# Patient Record
Sex: MALE | Race: WHITE | NOT HISPANIC OR LATINO | Employment: OTHER | URBAN - METROPOLITAN AREA
[De-identification: names, ages, dates, MRNs, and addresses within clinical notes are randomized per-mention and may not be internally consistent; named-entity substitution may affect disease eponyms.]

---

## 2023-10-27 ENCOUNTER — TELEPHONE (OUTPATIENT)
Dept: PAIN MEDICINE | Facility: CLINIC | Age: 63
End: 2023-10-27

## 2024-03-15 RX ORDER — OXYCODONE AND ACETAMINOPHEN 5; 325 MG/1; MG/1
1 TABLET ORAL EVERY 8 HOURS
COMMUNITY

## 2024-03-27 ENCOUNTER — APPOINTMENT (OUTPATIENT)
Dept: CARDIOLOGY | Facility: HOSPITAL | Age: 64
End: 2024-03-27
Payer: MEDICARE

## 2024-03-27 ENCOUNTER — APPOINTMENT (OUTPATIENT)
Dept: RADIOLOGY | Facility: HOSPITAL | Age: 64
End: 2024-03-27
Payer: MEDICARE

## 2024-03-27 ENCOUNTER — HOSPITAL ENCOUNTER (EMERGENCY)
Facility: HOSPITAL | Age: 64
Discharge: HOME | End: 2024-03-28
Payer: MEDICARE

## 2024-03-27 DIAGNOSIS — R19.7 DIARRHEA, UNSPECIFIED TYPE: Primary | ICD-10-CM

## 2024-03-27 LAB
ALBUMIN SERPL BCP-MCNC: 4.7 G/DL (ref 3.4–5)
ALP SERPL-CCNC: 129 U/L (ref 33–136)
ALT SERPL W P-5'-P-CCNC: 12 U/L (ref 10–52)
ANION GAP SERPL CALC-SCNC: 19 MMOL/L (ref 10–20)
AST SERPL W P-5'-P-CCNC: 13 U/L (ref 9–39)
BASOPHILS # BLD AUTO: 0.16 X10*3/UL (ref 0–0.1)
BASOPHILS NFR BLD AUTO: 0.9 %
BILIRUB SERPL-MCNC: 0.9 MG/DL (ref 0–1.2)
BUN SERPL-MCNC: 16 MG/DL (ref 6–23)
CALCIUM SERPL-MCNC: 10.2 MG/DL (ref 8.6–10.3)
CHLORIDE SERPL-SCNC: 106 MMOL/L (ref 98–107)
CO2 SERPL-SCNC: 19 MMOL/L (ref 21–32)
CREAT SERPL-MCNC: 1.12 MG/DL (ref 0.5–1.3)
EGFRCR SERPLBLD CKD-EPI 2021: 73 ML/MIN/1.73M*2
EOSINOPHIL # BLD AUTO: 0.53 X10*3/UL (ref 0–0.7)
EOSINOPHIL NFR BLD AUTO: 3 %
ERYTHROCYTE [DISTWIDTH] IN BLOOD BY AUTOMATED COUNT: 14 % (ref 11.5–14.5)
FLUAV RNA RESP QL NAA+PROBE: NOT DETECTED
FLUBV RNA RESP QL NAA+PROBE: NOT DETECTED
GLUCOSE SERPL-MCNC: 87 MG/DL (ref 74–99)
HCT VFR BLD AUTO: 45.5 % (ref 41–52)
HGB BLD-MCNC: 14.9 G/DL (ref 13.5–17.5)
HOLD SPECIMEN: NORMAL
IMM GRANULOCYTES # BLD AUTO: 0.08 X10*3/UL (ref 0–0.7)
IMM GRANULOCYTES NFR BLD AUTO: 0.5 % (ref 0–0.9)
LACTATE SERPL-SCNC: 0.8 MMOL/L (ref 0.4–2)
LIPASE SERPL-CCNC: 89 U/L (ref 9–82)
LYMPHOCYTES # BLD AUTO: 3.3 X10*3/UL (ref 1.2–4.8)
LYMPHOCYTES NFR BLD AUTO: 18.7 %
MAGNESIUM SERPL-MCNC: 1.64 MG/DL (ref 1.6–2.4)
MCH RBC QN AUTO: 26.6 PG (ref 26–34)
MCHC RBC AUTO-ENTMCNC: 32.7 G/DL (ref 32–36)
MCV RBC AUTO: 81 FL (ref 80–100)
MONOCYTES # BLD AUTO: 1.2 X10*3/UL (ref 0.1–1)
MONOCYTES NFR BLD AUTO: 6.8 %
NEUTROPHILS # BLD AUTO: 12.42 X10*3/UL (ref 1.2–7.7)
NEUTROPHILS NFR BLD AUTO: 70.1 %
NRBC BLD-RTO: 0 /100 WBCS (ref 0–0)
PLATELET # BLD AUTO: 384 X10*3/UL (ref 150–450)
POTASSIUM SERPL-SCNC: 3.5 MMOL/L (ref 3.5–5.3)
PROT SERPL-MCNC: 8.8 G/DL (ref 6.4–8.2)
RBC # BLD AUTO: 5.61 X10*6/UL (ref 4.5–5.9)
SARS-COV-2 RNA RESP QL NAA+PROBE: NOT DETECTED
SODIUM SERPL-SCNC: 140 MMOL/L (ref 136–145)
WBC # BLD AUTO: 17.7 X10*3/UL (ref 4.4–11.3)

## 2024-03-27 PROCEDURE — 83690 ASSAY OF LIPASE: CPT | Performed by: PHYSICIAN ASSISTANT

## 2024-03-27 PROCEDURE — 93005 ELECTROCARDIOGRAM TRACING: CPT

## 2024-03-27 PROCEDURE — 99285 EMERGENCY DEPT VISIT HI MDM: CPT | Mod: 25

## 2024-03-27 PROCEDURE — 2550000001 HC RX 255 CONTRASTS: Performed by: PHYSICIAN ASSISTANT

## 2024-03-27 PROCEDURE — 84075 ASSAY ALKALINE PHOSPHATASE: CPT | Performed by: PHYSICIAN ASSISTANT

## 2024-03-27 PROCEDURE — 36415 COLL VENOUS BLD VENIPUNCTURE: CPT | Performed by: PHYSICIAN ASSISTANT

## 2024-03-27 PROCEDURE — 83605 ASSAY OF LACTIC ACID: CPT | Performed by: PHYSICIAN ASSISTANT

## 2024-03-27 PROCEDURE — 74177 CT ABD & PELVIS W/CONTRAST: CPT

## 2024-03-27 PROCEDURE — 85025 COMPLETE CBC W/AUTO DIFF WBC: CPT | Performed by: PHYSICIAN ASSISTANT

## 2024-03-27 PROCEDURE — 87636 SARSCOV2 & INF A&B AMP PRB: CPT | Performed by: PHYSICIAN ASSISTANT

## 2024-03-27 PROCEDURE — 83735 ASSAY OF MAGNESIUM: CPT | Performed by: PHYSICIAN ASSISTANT

## 2024-03-27 PROCEDURE — 2500000001 HC RX 250 WO HCPCS SELF ADMINISTERED DRUGS (ALT 637 FOR MEDICARE OP): Performed by: NURSE PRACTITIONER

## 2024-03-27 PROCEDURE — 74177 CT ABD & PELVIS W/CONTRAST: CPT | Performed by: RADIOLOGY

## 2024-03-27 RX ORDER — OXYCODONE AND ACETAMINOPHEN 5; 325 MG/1; MG/1
1 TABLET ORAL ONCE
Status: COMPLETED | OUTPATIENT
Start: 2024-03-27 | End: 2024-03-27

## 2024-03-27 RX ADMIN — OXYCODONE HYDROCHLORIDE AND ACETAMINOPHEN 1 TABLET: 5; 325 TABLET ORAL at 23:04

## 2024-03-27 RX ADMIN — IOHEXOL 75 ML: 350 INJECTION, SOLUTION INTRAVENOUS at 21:53

## 2024-03-27 ASSESSMENT — LIFESTYLE VARIABLES
EVER FELT BAD OR GUILTY ABOUT YOUR DRINKING: NO
EVER HAD A DRINK FIRST THING IN THE MORNING TO STEADY YOUR NERVES TO GET RID OF A HANGOVER: NO
HAVE PEOPLE ANNOYED YOU BY CRITICIZING YOUR DRINKING: NO
HAVE YOU EVER FELT YOU SHOULD CUT DOWN ON YOUR DRINKING: NO
TOTAL SCORE: 0

## 2024-03-27 ASSESSMENT — PAIN - FUNCTIONAL ASSESSMENT
PAIN_FUNCTIONAL_ASSESSMENT: 0-10
PAIN_FUNCTIONAL_ASSESSMENT: 0-10

## 2024-03-27 ASSESSMENT — PAIN SCALES - GENERAL
PAINLEVEL_OUTOF10: 0 - NO PAIN
PAINLEVEL_OUTOF10: 0 - NO PAIN
PAINLEVEL_OUTOF10: 8

## 2024-03-27 ASSESSMENT — PAIN DESCRIPTION - ORIENTATION: ORIENTATION: LEFT

## 2024-03-27 ASSESSMENT — PAIN DESCRIPTION - LOCATION: LOCATION: LEG

## 2024-03-28 VITALS
DIASTOLIC BLOOD PRESSURE: 67 MMHG | RESPIRATION RATE: 15 BRPM | HEART RATE: 87 BPM | SYSTOLIC BLOOD PRESSURE: 106 MMHG | BODY MASS INDEX: 26.66 KG/M2 | OXYGEN SATURATION: 95 % | TEMPERATURE: 97.5 F | WEIGHT: 160 LBS | HEIGHT: 65 IN

## 2024-03-28 LAB
APPEARANCE UR: CLEAR
BILIRUB UR STRIP.AUTO-MCNC: NEGATIVE MG/DL
COLOR UR: YELLOW
GLUCOSE UR STRIP.AUTO-MCNC: NEGATIVE MG/DL
HOLD SPECIMEN: NORMAL
HYALINE CASTS #/AREA URNS AUTO: ABNORMAL /LPF
KETONES UR STRIP.AUTO-MCNC: NEGATIVE MG/DL
LEUKOCYTE ESTERASE UR QL STRIP.AUTO: NEGATIVE
NITRITE UR QL STRIP.AUTO: NEGATIVE
PH UR STRIP.AUTO: 5 [PH]
PROT UR STRIP.AUTO-MCNC: ABNORMAL MG/DL
RBC # UR STRIP.AUTO: NEGATIVE /UL
RBC #/AREA URNS AUTO: ABNORMAL /HPF
SP GR UR STRIP.AUTO: 1.04
UROBILINOGEN UR STRIP.AUTO-MCNC: <2 MG/DL
WBC #/AREA URNS AUTO: ABNORMAL /HPF

## 2024-03-28 PROCEDURE — 81001 URINALYSIS AUTO W/SCOPE: CPT | Performed by: PHYSICIAN ASSISTANT

## 2024-03-28 ASSESSMENT — PAIN SCALES - GENERAL
PAINLEVEL_OUTOF10: 5 - MODERATE PAIN
PAINLEVEL_OUTOF10: 0 - NO PAIN

## 2024-03-28 NOTE — ED PROVIDER NOTES
Limitations to History: None  External Records Reviewed  Independent Historians: None  Social determinants affecting care: None    HPI  Greg Bustos is a 63 y.o. male with history of CVA with left-sided weakness, hypertension, peripheral vascular disease, coronary artery disease, hyperlipidemia, COPD, depression, anxiety, bipolar disorder, who presents to the emergency department for assessment of diarrhea.  He reports that he is had multiple episodes of yellow-colored diarrhea.  He he denies any abdominal pains.  Has not had nausea or vomiting.  He reports that he was given Imodium however this has not helped.  He denies recent antibiotic usage.  He denies chest pains or shortness of breath.  He has not had fever or chills.  He reports slight cough and sneezing.  He is unsure if he had C. difficile colitis before.  He has no further complaints.    [facility]  Past Medical History:   Diagnosis Date    Abnormal findings on diagnostic imaging of other specified body structures     Abnormal CT of the chest    Abnormal findings on diagnostic imaging of other specified body structures     Abnormal CT of the chest    Personal history of other medical treatment     History of echocardiogram    Personal history of other medical treatment     History of nuclear stress test    reviewed by myself.    Meds  Current Outpatient Medications   Medication Instructions    oxyCODONE-acetaminophen (Percocet) 5-325 mg tablet 1 tablet, oral, Every 8 hours       Allergies  No Known Allergies reviewed by myself.    SHx  Social History     Tobacco Use    Smoking status: Never    Smokeless tobacco: Never   Vaping Use    Vaping Use: Never used   Substance Use Topics    Drug use: Never    reviewed by myself.      ------------------------------------------------------------------------------------------------------------------------------------------    /89 (BP Location: Right arm, Patient Position: Sitting)   Pulse 87   Temp 36.4 °C (97.5  "°F) (Temporal)   Resp 16   Ht 1.651 m (5' 5\")   Wt 72.6 kg (160 lb)   SpO2 95%   BMI 26.63 kg/m²     Physical Exam  Vitals and nursing note reviewed.   Constitutional:       General: He is not in acute distress.     Appearance: Normal appearance. He is normal weight. He is not ill-appearing or toxic-appearing.   HENT:      Head: Normocephalic.      Nose: Nose normal.      Mouth/Throat:      Mouth: Mucous membranes are moist.   Eyes:      Extraocular Movements: Extraocular movements intact.      Conjunctiva/sclera: Conjunctivae normal.   Cardiovascular:      Rate and Rhythm: Normal rate and regular rhythm.   Pulmonary:      Effort: Pulmonary effort is normal.      Breath sounds: Normal breath sounds.   Abdominal:      General: Abdomen is flat. Bowel sounds are normal.      Palpations: Abdomen is soft.      Tenderness: There is no abdominal tenderness.   Musculoskeletal:         General: Normal range of motion.      Cervical back: Neck supple.   Skin:     General: Skin is warm and dry.   Neurological:      Mental Status: He is alert and oriented to person, place, and time.      Comments: Left-sided weakness from previous stroke   Psychiatric:         Attention and Perception: Attention normal.         Mood and Affect: Mood normal.          ------------------------------------------------------------------------------------------------------------------------------------------  Labs  Labs Reviewed   CBC WITH AUTO DIFFERENTIAL - Abnormal       Result Value    WBC 17.7 (*)     nRBC 0.0      RBC 5.61      Hemoglobin 14.9      Hematocrit 45.5      MCV 81      MCH 26.6      MCHC 32.7      RDW 14.0      Platelets 384      Neutrophils % 70.1      Immature Granulocytes %, Automated 0.5      Lymphocytes % 18.7      Monocytes % 6.8      Eosinophils % 3.0      Basophils % 0.9      Neutrophils Absolute 12.42 (*)     Immature Granulocytes Absolute, Automated 0.08      Lymphocytes Absolute 3.30      Monocytes Absolute 1.20 (*)  "    Eosinophils Absolute 0.53      Basophils Absolute 0.16 (*)    COMPREHENSIVE METABOLIC PANEL - Abnormal    Glucose 87      Sodium 140      Potassium 3.5      Chloride 106      Bicarbonate 19 (*)     Anion Gap 19      Urea Nitrogen 16      Creatinine 1.12      eGFR 73      Calcium 10.2      Albumin 4.7      Alkaline Phosphatase 129      Total Protein 8.8 (*)     AST 13      Bilirubin, Total 0.9      ALT 12     LIPASE - Abnormal    Lipase 89 (*)     Narrative:     Venipuncture immediately after or during the administration of Metamizole may lead to falsely low results. Testing should be performed immediately prior to Metamizole dosing.   MAGNESIUM - Normal    Magnesium 1.64     LACTATE - Normal    Lactate 0.8      Narrative:     Venipuncture immediately after or during the administration of Metamizole may lead to falsely low results. Testing should be performed immediately  prior to Metamizole dosing.   SARS-COV-2 AND INFLUENZA A/B PCR - Normal    Flu A Result Not Detected      Flu B Result Not Detected      Coronavirus 2019, PCR Not Detected      Narrative:     This assay has received FDA Emergency Use Authorization (EUA) and  is only authorized for the duration of time that circumstances exist to justify the authorization of the emergency use of in vitro diagnostic tests for the detection of SARS-CoV-2 virus and/or diagnosis of COVID-19 infection under section 564(b)(1) of the Act, 21 U.S.C. 360bbb-3(b)(1). Testing for SARS-CoV-2 is only recommended for patients who meet current clinical and/or epidemiological criteria as defined by federal, state, or local public health directives. This assay is an in vitro diagnostic nucleic acid amplification test for the qualitative detection of SARS-CoV-2, Influenza A, and Influenza B from nasopharyngeal specimens and has been validated for use at [facility]. Negative results do not preclude COVID-19 infections or Influenza A/B infections, and should  not be used as the sole basis for diagnosis, treatment, or other management decisions. If Influenza A/B and RSV PCR results are negative, testing for Parainfluenza virus, Adenovirus and Metapneumovirus is routinely performed for [facility] pediatric oncology and intensive care inpatients, and is available on other patients by placing an add-on request.    C. DIFFICILE, PCR   STOOL PATHOGEN PANEL, PCR   URINALYSIS WITH REFLEX CULTURE AND MICROSCOPIC    Narrative:     The following orders were created for panel order Urinalysis with Reflex Culture and Microscopic.  Procedure                               Abnormality         Status                     ---------                               -----------         ------                     Urinalysis with Reflex Cu...[[id number]]                                                 Extra Urine Gray Tube[[id number]]                                                          Please view results for these tests on the individual orders.   URINALYSIS WITH REFLEX CULTURE AND MICROSCOPIC   EXTRA URINE GRAY TUBE   GREEN TOP        Imaging  CT abdomen pelvis w IV contrast    (Results Pending)        ED Course        Medical Decision Making: He did not appear ill or toxic.  Vital signs reviewed and stable.  Comprehensive workup was initiated.    Differential diagnoses considered: Colitis, C. difficile, viral illness, dehydration, JONAH, electrolyte abnormalities, others    EKG interpreted by myself and ED attending: Normal sinus rhythm.  Ventricular rate 94 bpm.  No acute ST elevations or depressions.  Motion artifact throughout.    I reviewed the labs from today.  Leukocytosis of 17.7.  H&H is stable.  Bicarb 19.  BUN and creatinine normal.  COVID influenza negative.  Lactic normal.  Magnesium normal.  Lipase 89.  CT and stool cultures pending at the end of my shift.  Case was discussed with my colleague, Philip Mayo who will be making final patient disposition           Salvador Aldridge,  LARS  03/27/24 2151

## 2024-03-28 NOTE — PROGRESS NOTES
Emergency Medicine Transition of Care Note.    I received Greg Bustos in signout from [employer].  Please see the previous ED provider note for all HPI, PE and MDM up to the time of signout at 2200. This is in addition to the primary record.    In brief Greg Bustos is an 63 y.o. male presenting for   Chief Complaint   Patient presents with    Diarrhea     Patient BIB EMS from [facility]. Diarrhea x 3 days. Last Imodium given at 6:30pm today. Denies abdominal pain, nausea and vomiting. Hs of CVA 3/2023 with left sided weakness. A&Ox3, verbal.     At the time of signout we were awaiting: Urinalysis and CAT scan results.    ED Course as of 03/28/24 0117   Thu Mar 28, 2024   0037 CT abdomen and pelvis reveals  IMPRESSION:  No evidence of bowel obstruction or acute appendicitis. Mild  fluid-filled ascending colon. Colonic diverticulosis without evidence  of acute diverticulitis.      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   [EC]      ED Course User Index  [EC] Philip Mayo, APRN-CNP         Diagnoses as of 03/28/24 0117   Diarrhea, unspecified type       Medical Decision Making  Patient care was signed out to me at this time.  Please refer to initial providers note for initial plan of care of this patient.  Patient care was signed out pending urinalysis and CT of the abdomen and pelvis.  CT abdomen and pelvis results are listed above with no acute findings.  Urinalysis is unremarkable.  Reevaluation patient reveals improvement states he feels much better and is requesting discharge home.  Patient states he has not had any further diarrhea since taking Imodium earlier today.  Discussion was had with patient in regards to elevated white blood cell count and further evaluation.  Patient states he does not want to be admitted to hospital.  Patient has full mental capacity and understands this material no  further questions.  Through shared decision making plan will be for patient to continue to observe symptoms and diarrhea at home.  If symptoms become worse patient was given strict precautions return to emergency room for further evaluation.  Patient was agreeable with this plan and discharged home in stable condition.    Final diagnoses:   [R19.7] Diarrhea, unspecified type           Procedure  Procedures    Philip Mayo, APRN-CNP

## 2024-04-07 ENCOUNTER — APPOINTMENT (OUTPATIENT)
Dept: RADIOLOGY | Facility: HOSPITAL | Age: 64
DRG: 194 | End: 2024-04-07
Payer: MEDICARE

## 2024-04-07 ENCOUNTER — APPOINTMENT (OUTPATIENT)
Dept: CARDIOLOGY | Facility: HOSPITAL | Age: 64
DRG: 194 | End: 2024-04-07
Payer: MEDICARE

## 2024-04-07 ENCOUNTER — HOSPITAL ENCOUNTER (INPATIENT)
Facility: HOSPITAL | Age: 64
LOS: 1 days | Discharge: HOME | DRG: 194 | End: 2024-04-09
Attending: STUDENT IN AN ORGANIZED HEALTH CARE EDUCATION/TRAINING PROGRAM | Admitting: INTERNAL MEDICINE
Payer: MEDICARE

## 2024-04-07 DIAGNOSIS — J11.1 FLU: ICD-10-CM

## 2024-04-07 DIAGNOSIS — R06.02 SHORT OF BREATH ON EXERTION: Primary | ICD-10-CM

## 2024-04-07 DIAGNOSIS — J43.9 PULMONARY EMPHYSEMA, UNSPECIFIED EMPHYSEMA TYPE (MULTI): ICD-10-CM

## 2024-04-07 LAB
ALBUMIN SERPL BCP-MCNC: 4.1 G/DL (ref 3.4–5)
ALP SERPL-CCNC: 104 U/L (ref 33–136)
ALT SERPL W P-5'-P-CCNC: 25 U/L (ref 10–52)
ANION GAP BLDV CALCULATED.4IONS-SCNC: 18 MMOL/L (ref 10–25)
ANION GAP SERPL CALC-SCNC: 21 MMOL/L (ref 10–20)
AST SERPL W P-5'-P-CCNC: 32 U/L (ref 9–39)
BASE EXCESS BLDV CALC-SCNC: -6.4 MMOL/L (ref -2–3)
BASOPHILS # BLD MANUAL: 0.19 X10*3/UL (ref 0–0.1)
BASOPHILS NFR BLD MANUAL: 1 %
BILIRUB SERPL-MCNC: 0.5 MG/DL (ref 0–1.2)
BODY TEMPERATURE: 37 DEGREES CELSIUS
BUN SERPL-MCNC: 71 MG/DL (ref 6–23)
CA-I BLDV-SCNC: 1.24 MMOL/L (ref 1.1–1.33)
CALCIUM SERPL-MCNC: 9.4 MG/DL (ref 8.6–10.3)
CHLORIDE BLDV-SCNC: 99 MMOL/L (ref 98–107)
CHLORIDE SERPL-SCNC: 100 MMOL/L (ref 98–107)
CO2 SERPL-SCNC: 12 MMOL/L (ref 21–32)
CREAT SERPL-MCNC: 2.16 MG/DL (ref 0.5–1.3)
EGFRCR SERPLBLD CKD-EPI 2021: 33 ML/MIN/1.73M*2
EOSINOPHIL # BLD MANUAL: 0 X10*3/UL (ref 0–0.7)
EOSINOPHIL NFR BLD MANUAL: 0 %
ERYTHROCYTE [DISTWIDTH] IN BLOOD BY AUTOMATED COUNT: 13.2 % (ref 11.5–14.5)
FLUAV RNA RESP QL NAA+PROBE: DETECTED
FLUBV RNA RESP QL NAA+PROBE: NOT DETECTED
GLUCOSE BLDV-MCNC: 140 MG/DL (ref 74–99)
GLUCOSE SERPL-MCNC: 125 MG/DL (ref 74–99)
HCO3 BLDV-SCNC: 16.9 MMOL/L (ref 22–26)
HCT VFR BLD AUTO: 44.5 % (ref 41–52)
HCT VFR BLD EST: 47 % (ref 41–52)
HGB BLD-MCNC: 14.6 G/DL (ref 13.5–17.5)
HGB BLDV-MCNC: 15.6 G/DL (ref 13.5–17.5)
IMM GRANULOCYTES # BLD AUTO: 0.13 X10*3/UL (ref 0–0.7)
IMM GRANULOCYTES NFR BLD AUTO: 0.7 % (ref 0–0.9)
INHALED O2 CONCENTRATION: 21 %
LACTATE BLDV-SCNC: 2.4 MMOL/L (ref 0.4–2)
LACTATE SERPL-SCNC: 2 MMOL/L (ref 0.4–2)
LIPASE SERPL-CCNC: 32 U/L (ref 9–82)
LYMPHOCYTES # BLD MANUAL: 2.3 X10*3/UL (ref 1.2–4.8)
LYMPHOCYTES NFR BLD MANUAL: 12 %
MAGNESIUM SERPL-MCNC: 2.24 MG/DL (ref 1.6–2.4)
MCH RBC QN AUTO: 26.3 PG (ref 26–34)
MCHC RBC AUTO-ENTMCNC: 32.8 G/DL (ref 32–36)
MCV RBC AUTO: 80 FL (ref 80–100)
MONOCYTES # BLD MANUAL: 0.58 X10*3/UL (ref 0.1–1)
MONOCYTES NFR BLD MANUAL: 3 %
NEUTS SEG # BLD MANUAL: 15.94 X10*3/UL (ref 1.2–7)
NEUTS SEG NFR BLD MANUAL: 83 %
NRBC BLD-RTO: 0 /100 WBCS (ref 0–0)
OXYHGB MFR BLDV: 51.9 % (ref 45–75)
PCO2 BLDV: 28 MM HG (ref 41–51)
PH BLDV: 7.39 PH (ref 7.33–7.43)
PLATELET # BLD AUTO: 377 X10*3/UL (ref 150–450)
PO2 BLDV: 39 MM HG (ref 35–45)
POTASSIUM BLDV-SCNC: 3.2 MMOL/L (ref 3.5–5.3)
POTASSIUM SERPL-SCNC: 3.4 MMOL/L (ref 3.5–5.3)
PROT SERPL-MCNC: 8.4 G/DL (ref 6.4–8.2)
RBC # BLD AUTO: 5.55 X10*6/UL (ref 4.5–5.9)
RBC MORPH BLD: ABNORMAL
ROULEAUX BLD QL SMEAR: PRESENT
SAO2 % BLDV: 53 % (ref 45–75)
SARS-COV-2 RNA RESP QL NAA+PROBE: NOT DETECTED
SODIUM BLDV-SCNC: 131 MMOL/L (ref 136–145)
SODIUM SERPL-SCNC: 130 MMOL/L (ref 136–145)
TARGETS BLD QL SMEAR: ABNORMAL
TOTAL CELLS COUNTED BLD: 100
VARIANT LYMPHS # BLD MANUAL: 0.19 X10*3/UL (ref 0–0.5)
VARIANT LYMPHS NFR BLD: 1 %
WBC # BLD AUTO: 19.2 X10*3/UL (ref 4.4–11.3)

## 2024-04-07 PROCEDURE — 96365 THER/PROPH/DIAG IV INF INIT: CPT

## 2024-04-07 PROCEDURE — 2500000004 HC RX 250 GENERAL PHARMACY W/ HCPCS (ALT 636 FOR OP/ED): Performed by: NURSE PRACTITIONER

## 2024-04-07 PROCEDURE — G0378 HOSPITAL OBSERVATION PER HR: HCPCS

## 2024-04-07 PROCEDURE — 83690 ASSAY OF LIPASE: CPT | Performed by: NURSE PRACTITIONER

## 2024-04-07 PROCEDURE — 83605 ASSAY OF LACTIC ACID: CPT | Performed by: STUDENT IN AN ORGANIZED HEALTH CARE EDUCATION/TRAINING PROGRAM

## 2024-04-07 PROCEDURE — 36415 COLL VENOUS BLD VENIPUNCTURE: CPT | Performed by: STUDENT IN AN ORGANIZED HEALTH CARE EDUCATION/TRAINING PROGRAM

## 2024-04-07 PROCEDURE — 2500000004 HC RX 250 GENERAL PHARMACY W/ HCPCS (ALT 636 FOR OP/ED): Performed by: STUDENT IN AN ORGANIZED HEALTH CARE EDUCATION/TRAINING PROGRAM

## 2024-04-07 PROCEDURE — 96368 THER/DIAG CONCURRENT INF: CPT

## 2024-04-07 PROCEDURE — 84132 ASSAY OF SERUM POTASSIUM: CPT | Performed by: NURSE PRACTITIONER

## 2024-04-07 PROCEDURE — 2500000001 HC RX 250 WO HCPCS SELF ADMINISTERED DRUGS (ALT 637 FOR MEDICARE OP): Performed by: STUDENT IN AN ORGANIZED HEALTH CARE EDUCATION/TRAINING PROGRAM

## 2024-04-07 PROCEDURE — 2500000002 HC RX 250 W HCPCS SELF ADMINISTERED DRUGS (ALT 637 FOR MEDICARE OP, ALT 636 FOR OP/ED): Performed by: STUDENT IN AN ORGANIZED HEALTH CARE EDUCATION/TRAINING PROGRAM

## 2024-04-07 PROCEDURE — 83735 ASSAY OF MAGNESIUM: CPT | Performed by: NURSE PRACTITIONER

## 2024-04-07 PROCEDURE — 84132 ASSAY OF SERUM POTASSIUM: CPT | Performed by: STUDENT IN AN ORGANIZED HEALTH CARE EDUCATION/TRAINING PROGRAM

## 2024-04-07 PROCEDURE — 87636 SARSCOV2 & INF A&B AMP PRB: CPT | Performed by: NURSE PRACTITIONER

## 2024-04-07 PROCEDURE — 96361 HYDRATE IV INFUSION ADD-ON: CPT

## 2024-04-07 PROCEDURE — 94640 AIRWAY INHALATION TREATMENT: CPT

## 2024-04-07 PROCEDURE — 71045 X-RAY EXAM CHEST 1 VIEW: CPT | Performed by: RADIOLOGY

## 2024-04-07 PROCEDURE — 93005 ELECTROCARDIOGRAM TRACING: CPT

## 2024-04-07 PROCEDURE — 96367 TX/PROPH/DG ADDL SEQ IV INF: CPT

## 2024-04-07 PROCEDURE — 71045 X-RAY EXAM CHEST 1 VIEW: CPT

## 2024-04-07 PROCEDURE — 99285 EMERGENCY DEPT VISIT HI MDM: CPT | Mod: 25

## 2024-04-07 PROCEDURE — 85027 COMPLETE CBC AUTOMATED: CPT | Performed by: NURSE PRACTITIONER

## 2024-04-07 PROCEDURE — 96375 TX/PRO/DX INJ NEW DRUG ADDON: CPT

## 2024-04-07 PROCEDURE — 85007 BL SMEAR W/DIFF WBC COUNT: CPT | Performed by: NURSE PRACTITIONER

## 2024-04-07 RX ORDER — METHOCARBAMOL 500 MG/1
500 TABLET, FILM COATED ORAL EVERY 8 HOURS PRN
Status: DISCONTINUED | OUTPATIENT
Start: 2024-04-07 | End: 2024-04-09 | Stop reason: HOSPADM

## 2024-04-07 RX ORDER — SODIUM CHLORIDE, SODIUM LACTATE, POTASSIUM CHLORIDE, CALCIUM CHLORIDE 600; 310; 30; 20 MG/100ML; MG/100ML; MG/100ML; MG/100ML
75 INJECTION, SOLUTION INTRAVENOUS CONTINUOUS
Status: DISCONTINUED | OUTPATIENT
Start: 2024-04-07 | End: 2024-04-09

## 2024-04-07 RX ORDER — MIRTAZAPINE 15 MG/1
15 TABLET, FILM COATED ORAL NIGHTLY
COMMUNITY

## 2024-04-07 RX ORDER — CARVEDILOL 25 MG/1
25 TABLET ORAL 2 TIMES DAILY
COMMUNITY

## 2024-04-07 RX ORDER — TAMSULOSIN HYDROCHLORIDE 0.4 MG/1
0.4 CAPSULE ORAL DAILY
Status: DISCONTINUED | OUTPATIENT
Start: 2024-04-07 | End: 2024-04-09 | Stop reason: HOSPADM

## 2024-04-07 RX ORDER — IPRATROPIUM BROMIDE AND ALBUTEROL SULFATE 2.5; .5 MG/3ML; MG/3ML
3 SOLUTION RESPIRATORY (INHALATION) EVERY 20 MIN
Status: COMPLETED | OUTPATIENT
Start: 2024-04-07 | End: 2024-04-07

## 2024-04-07 RX ORDER — ATORVASTATIN CALCIUM 80 MG/1
80 TABLET, FILM COATED ORAL NIGHTLY
COMMUNITY

## 2024-04-07 RX ORDER — IBUPROFEN 200 MG
1 TABLET ORAL DAILY
Status: DISCONTINUED | OUTPATIENT
Start: 2024-04-07 | End: 2024-04-09 | Stop reason: HOSPADM

## 2024-04-07 RX ORDER — IPRATROPIUM BROMIDE AND ALBUTEROL SULFATE 2.5; .5 MG/3ML; MG/3ML
3 SOLUTION RESPIRATORY (INHALATION)
Status: DISCONTINUED | OUTPATIENT
Start: 2024-04-08 | End: 2024-04-09 | Stop reason: HOSPADM

## 2024-04-07 RX ORDER — CILOSTAZOL 100 MG/1
100 TABLET ORAL 2 TIMES DAILY
COMMUNITY

## 2024-04-07 RX ORDER — CEFTRIAXONE 1 G/50ML
1 INJECTION, SOLUTION INTRAVENOUS EVERY 24 HOURS
Status: DISCONTINUED | OUTPATIENT
Start: 2024-04-07 | End: 2024-04-09

## 2024-04-07 RX ORDER — PANTOPRAZOLE SODIUM 40 MG/1
40 TABLET, DELAYED RELEASE ORAL
Status: DISCONTINUED | OUTPATIENT
Start: 2024-04-08 | End: 2024-04-09 | Stop reason: HOSPADM

## 2024-04-07 RX ORDER — MIRTAZAPINE 15 MG/1
7.5 TABLET, FILM COATED ORAL NIGHTLY
Status: DISCONTINUED | OUTPATIENT
Start: 2024-04-07 | End: 2024-04-09 | Stop reason: HOSPADM

## 2024-04-07 RX ORDER — ATORVASTATIN CALCIUM 80 MG/1
80 TABLET, FILM COATED ORAL NIGHTLY
Status: DISCONTINUED | OUTPATIENT
Start: 2024-04-07 | End: 2024-04-09 | Stop reason: HOSPADM

## 2024-04-07 RX ORDER — IPRATROPIUM BROMIDE AND ALBUTEROL SULFATE 2.5; .5 MG/3ML; MG/3ML
3 SOLUTION RESPIRATORY (INHALATION)
Status: DISCONTINUED | OUTPATIENT
Start: 2024-04-07 | End: 2024-04-07

## 2024-04-07 RX ORDER — CARVEDILOL 25 MG/1
25 TABLET ORAL
Status: DISCONTINUED | OUTPATIENT
Start: 2024-04-08 | End: 2024-04-09 | Stop reason: HOSPADM

## 2024-04-07 RX ORDER — OSELTAMIVIR PHOSPHATE 30 MG/1
30 CAPSULE ORAL 2 TIMES DAILY
Status: DISCONTINUED | OUTPATIENT
Start: 2024-04-07 | End: 2024-04-09 | Stop reason: HOSPADM

## 2024-04-07 RX ORDER — MAGNESIUM SULFATE HEPTAHYDRATE 40 MG/ML
2 INJECTION, SOLUTION INTRAVENOUS ONCE
Status: COMPLETED | OUTPATIENT
Start: 2024-04-07 | End: 2024-04-07

## 2024-04-07 RX ORDER — IPRATROPIUM BROMIDE AND ALBUTEROL SULFATE 2.5; .5 MG/3ML; MG/3ML
3 SOLUTION RESPIRATORY (INHALATION) EVERY 8 HOURS PRN
Status: ON HOLD | COMMUNITY
End: 2024-04-09 | Stop reason: SDUPTHER

## 2024-04-07 RX ORDER — IPRATROPIUM BROMIDE AND ALBUTEROL SULFATE 2.5; .5 MG/3ML; MG/3ML
3 SOLUTION RESPIRATORY (INHALATION) EVERY 2 HOUR PRN
Status: DISCONTINUED | OUTPATIENT
Start: 2024-04-07 | End: 2024-04-09 | Stop reason: HOSPADM

## 2024-04-07 RX ORDER — NAPROXEN SODIUM 220 MG/1
81 TABLET, FILM COATED ORAL DAILY
Status: DISCONTINUED | OUTPATIENT
Start: 2024-04-07 | End: 2024-04-09 | Stop reason: HOSPADM

## 2024-04-07 RX ORDER — ASPIRIN 81 MG/1
81 TABLET ORAL DAILY
COMMUNITY

## 2024-04-07 RX ORDER — PANTOPRAZOLE SODIUM 40 MG/1
40 TABLET, DELAYED RELEASE ORAL DAILY
COMMUNITY

## 2024-04-07 RX ORDER — AMLODIPINE BESYLATE 10 MG/1
10 TABLET ORAL DAILY
COMMUNITY

## 2024-04-07 RX ORDER — AMLODIPINE BESYLATE 10 MG/1
10 TABLET ORAL DAILY
Status: DISCONTINUED | OUTPATIENT
Start: 2024-04-07 | End: 2024-04-09 | Stop reason: HOSPADM

## 2024-04-07 RX ORDER — ESCITALOPRAM OXALATE 10 MG/1
10 TABLET ORAL DAILY
COMMUNITY

## 2024-04-07 RX ADMIN — AZITHROMYCIN DIHYDRATE 500 MG: 500 INJECTION, POWDER, LYOPHILIZED, FOR SOLUTION INTRAVENOUS at 20:32

## 2024-04-07 RX ADMIN — MIRTAZAPINE 7.5 MG: 15 TABLET, FILM COATED ORAL at 20:41

## 2024-04-07 RX ADMIN — ASPIRIN 81 MG CHEWABLE TABLET 81 MG: 81 TABLET CHEWABLE at 20:41

## 2024-04-07 RX ADMIN — AMLODIPINE BESYLATE 10 MG: 10 TABLET ORAL at 20:41

## 2024-04-07 RX ADMIN — SODIUM CHLORIDE, POTASSIUM CHLORIDE, SODIUM LACTATE AND CALCIUM CHLORIDE 75 ML/HR: 600; 310; 30; 20 INJECTION, SOLUTION INTRAVENOUS at 21:05

## 2024-04-07 RX ADMIN — METHYLPREDNISOLONE SODIUM SUCCINATE 125 MG: 125 INJECTION, POWDER, FOR SOLUTION INTRAMUSCULAR; INTRAVENOUS at 16:41

## 2024-04-07 RX ADMIN — TAMSULOSIN HYDROCHLORIDE 0.4 MG: 0.4 CAPSULE ORAL at 20:41

## 2024-04-07 RX ADMIN — CEFTRIAXONE SODIUM 1 G: 1 INJECTION, SOLUTION INTRAVENOUS at 20:33

## 2024-04-07 RX ADMIN — SODIUM CHLORIDE 1000 ML: 9 INJECTION, SOLUTION INTRAVENOUS at 16:40

## 2024-04-07 RX ADMIN — IPRATROPIUM BROMIDE AND ALBUTEROL SULFATE 3 ML: .5; 3 SOLUTION RESPIRATORY (INHALATION) at 16:40

## 2024-04-07 RX ADMIN — IPRATROPIUM BROMIDE AND ALBUTEROL SULFATE 3 ML: .5; 3 SOLUTION RESPIRATORY (INHALATION) at 16:00

## 2024-04-07 RX ADMIN — MAGNESIUM SULFATE HEPTAHYDRATE 2 G: 40 INJECTION, SOLUTION INTRAVENOUS at 16:43

## 2024-04-07 RX ADMIN — IPRATROPIUM BROMIDE AND ALBUTEROL SULFATE 3 ML: .5; 3 SOLUTION RESPIRATORY (INHALATION) at 16:20

## 2024-04-07 RX ADMIN — ATORVASTATIN CALCIUM 80 MG: 80 TABLET, FILM COATED ORAL at 20:41

## 2024-04-07 ASSESSMENT — LIFESTYLE VARIABLES
EVER HAD A DRINK FIRST THING IN THE MORNING TO STEADY YOUR NERVES TO GET RID OF A HANGOVER: NO
TOTAL SCORE: 0
HAVE YOU EVER FELT YOU SHOULD CUT DOWN ON YOUR DRINKING: NO
HAVE PEOPLE ANNOYED YOU BY CRITICIZING YOUR DRINKING: NO
EVER FELT BAD OR GUILTY ABOUT YOUR DRINKING: NO

## 2024-04-07 ASSESSMENT — COLUMBIA-SUICIDE SEVERITY RATING SCALE - C-SSRS
1. IN THE PAST MONTH, HAVE YOU WISHED YOU WERE DEAD OR WISHED YOU COULD GO TO SLEEP AND NOT WAKE UP?: NO
2. HAVE YOU ACTUALLY HAD ANY THOUGHTS OF KILLING YOURSELF?: NO
6. HAVE YOU EVER DONE ANYTHING, STARTED TO DO ANYTHING, OR PREPARED TO DO ANYTHING TO END YOUR LIFE?: NO

## 2024-04-07 ASSESSMENT — PAIN SCALES - GENERAL: PAINLEVEL_OUTOF10: 0 - NO PAIN

## 2024-04-07 ASSESSMENT — PAIN - FUNCTIONAL ASSESSMENT: PAIN_FUNCTIONAL_ASSESSMENT: 0-10

## 2024-04-07 NOTE — ED TRIAGE NOTES
Patient BIB family for c/o headache, vomiting, diarrhea, blood in stool for a few weeks. Per POA, he has been tested multiple times for flu/covid but has come back negative. Multiple attempt to read BP unsuccessful, pt unable to sit still.

## 2024-04-07 NOTE — PROGRESS NOTES
Pharmacy Medication History Review    Greg Bustos is a 63 y.o. male admitted for Shortness of breath. Pharmacy reviewed the patient's [id number] medications and allergies for accuracy.    The list below reflectives the updated PTA list. Please review each medication in order reconciliation for additional clarification and justification.  Prior to Admission medications    Medication Sig Start Date End Date Taking? Authorizing Provider   aspirin 81 mg EC tablet Take 1 tablet (81 mg) by mouth once daily.   Yes Historical Provider, MD   escitalopram (Lexapro) 10 mg tablet Take 1 tablet (10 mg) by mouth once daily.   Yes Historical Provider, MD   ipratropium-albuteroL (Duo-Neb) 0.5-2.5 mg/3 mL nebulizer solution Take 3 mL by nebulization every 8 hours if needed.   Yes Historical Provider, MD   mirtazapine (Remeron) 15 mg tablet Take 1 tablet (15 mg) by mouth once daily at bedtime.   Yes Historical Provider, MD   pantoprazole (ProtoNix) 40 mg EC tablet Take 1 tablet (40 mg) by mouth once daily.   Yes Historical Provider, MD   amLODIPine (Norvasc) 10 mg tablet Take 1 tablet (10 mg) by mouth once daily.    Historical Provider, MD   atorvastatin (Lipitor) 80 mg tablet Take 1 tablet (80 mg) by mouth once daily at bedtime.    Historical Provider, MD   carvedilol (Coreg) 25 mg tablet Take 1 tablet (25 mg) by mouth 2 times a day. Take with food.    Historical Provider, MD   cilostazol (Pletal) 100 mg tablet Take 1 tablet (100 mg) by mouth 2 times a day.    Historical Provider, MD   oxyCODONE-acetaminophen (Percocet) 5-325 mg tablet Take 1 tablet by mouth every 8 hours.    Historical Provider, MD        The list below reflectives the updated allergy list. Please review each documented allergy for additional clarification and justification.  Allergies  Reviewed by ANTONIO Barton on 4/7/2024        Severity Reactions Comments    Hydrocodone-acetaminophen Medium GI bleeding, Dizziness, Nausea/vomiting      Divalproex Sodium Low Other Weight gain - 20 lbs in 3 weeks    Gabapentin Low Hives     Tramadol Low Hives             Below are additional concerns with the patient's PTA list.      Marcelina Lopez     ANY BED

## 2024-04-07 NOTE — ED PROVIDER NOTES
HPI   Chief Complaint   Patient presents with    Flu Symptoms       HPI     Patient is a 63-year-old male with past medical history of COPD, previous stroke with residual left-sided hemiplegia presenting from nursing facility in the setting of symptoms of diarrhea, shortness of breath and fatigue.  On review of chart patient was here March 27 when he had diarrhea at that time.  He had an evaluation including blood work and CT imaging.  Patient CT imaging at that time demonstrated fluid-filled colon consistent with an enteritis.  Otherwise labs on review.  Labs otherwise notable for a leukocytosis with 17.7.  Patient states since being discharged has had continued diarrhea.  He also feels short of breath and generally weak.  That time on review they were planning to do stool testing and C. difficile testing however that was ultimately canceled.               [place]au Coma Scale Score: 15                     Patient History   Past Medical History:   Diagnosis Date    Abnormal findings on diagnostic imaging of other specified body structures     Abnormal CT of the chest    Abnormal findings on diagnostic imaging of other specified body structures     Abnormal CT of the chest    Personal history of other medical treatment     History of echocardiogram    Personal history of other medical treatment     History of nuclear stress test     Past Surgical History:   Procedure Laterality Date    CHOLECYSTECTOMY  07/14/2016    Cholecystectomy    MR HEAD ANGIO WO IV CONTRAST  3/2/2023    MR HEAD ANGIO WO IV CONTRAST PAR MRI    MR NECK ANGIO WO IV CONTRAST  3/2/2023    MR NECK ANGIO WO IV CONTRAST PAR MRI    OTHER SURGICAL HISTORY  07/14/2016    Cath Stent Placement Number Of Stents Placed:    OTHER SURGICAL HISTORY  10/27/2020    Finger surgical procedure     No family history on file.  Social History     Tobacco Use    Smoking status: Never    Smokeless tobacco: Never   Vaping Use    Vaping Use: Never used   Substance Use Topics     Alcohol use: Not on file    Drug use: Never       Physical Exam   ED Triage Vitals [04/07/24 1524]   Temperature Pulse Respirations BP   36 °C (96.8 °F) -- 16 --      SpO2 Temp src Heart Rate Source Patient Position   -- -- -- --      BP Location FiO2 (%)     -- --       Physical Exam  Vitals and nursing note reviewed.   Constitutional:       General: He is not in acute distress.     Appearance: He is well-developed.   HENT:      Head: Normocephalic and atraumatic.   Eyes:      Conjunctiva/sclera: Conjunctivae normal.   Cardiovascular:      Rate and Rhythm: Normal rate and regular rhythm.      Heart sounds: No murmur heard.  Pulmonary:      Effort: Respiratory distress present.      Breath sounds: Normal breath sounds.   Abdominal:      Palpations: Abdomen is soft.      Tenderness: There is no abdominal tenderness.   Musculoskeletal:         General: No swelling.      Cervical back: Neck supple.   Skin:     General: Skin is warm and dry.      Capillary Refill: Capillary refill takes less than 2 seconds.   Neurological:      Mental Status: He is alert.   Psychiatric:         Mood and Affect: Mood normal.         ED Course & MDM   ED Course as of 04/09/24 0421   Sun Apr 07, 2024   1637 CBC and Auto Differential(!)  Rising leukocytosis [AH]      ED Course User Index  [AH] Lainey Sierra MD         Diagnoses as of 04/09/24 0421   Short of breath on exertion   Flu       Medical Decision Making  EKG as interpreted by myself: Rate 68, , QRS 88, QTc 42.  No STEMI or ischemic changes noted.    Patient is a 62-year-old male with history of hypertension, peripheral vascular use, CAD, hyperlipidemia, COPD, tobacco use disorder presenting with shortness of breath, diarrhea.  Workup pursued ultimately found here to have the flu which is likely contributing to patient's symptoms.  Otherwise stable here labs reviewed he does have an JONAH likely presenting with significant dehydration.  Will plan for admission and hydration  and close monitoring.  Patient updated regarding plans.  In agreement.        Procedure  Procedures     Lainey Sierra MD  04/09/24 0426     Relaxed

## 2024-04-07 NOTE — H&P
HPI:  Greg Bustos is a 63 y.o. year old male with a history of hypertension, CAD, PAD, COPD, hyperlipidemia and recent CVA who presented from nursing facility for acute dyspnea and persistent diarrhea.  Patient recently seen in the ER for profuse diarrhea, found to have acute diverticulosis without diverticulitis on imaging and was discharged home. Since discharge, he has noted increase in diarrhea and more recently dyspnea with generalized weakness.  No fevers or chills.    In the ER, vital stable.  Labs with leukocytosis (19), sodium 130, BUN 71 and creatinine 2.16 (1.1 to on 3/27 from recent discharge).  Influenza A positive.  CXR with possible left lower lobe infiltrate.  Patient given 1 L NS bolus, DuoNebs and 125 mg Solu-Medrol push in the ER.    A 10 point ROS was performed with the patient denying any complaint at this time aside from those listed in the HPI above.     Past Medical History: as above  Past Surgical History: Cholecystectomy, cardiac stent.  Family History: noncontributory  Social History: former smoker, EtOH use.    Visit Vitals  /71   Pulse 67   Temp 36 °C (96.8 °F)   Resp (!) 22      Physical Exam:   GENERAL: No apparent distress.  HEAD:  Normocephalic, atraumatic.  EYES:  Round and reactive.  ENT:  No nasal discharge, mucous membranes moist and pink.  NECK:  Atraumatic, no meningismus.  CARDIOVASCULAR:  Regular rate and rhythm, no murmur.  RESPIRATORY:  Diminished breath sounds, no active work of breathing.   ABDOMEN:  Soft, no tenderness, nondistended.  EXTREMITIES:  No peripheral edema, no calf tenderness.  SKIN:  Warm and dry.  NEUROLOGICAL:  Nonfocal grossly.    Lab Results   Component Value Date    WBC 19.2 (H) 04/07/2024    HGB 14.6 04/07/2024    HCT 44.5 04/07/2024    MCV 80 04/07/2024     04/07/2024      Lab Results   Component Value Date    GLUCOSE 125 (H) 04/07/2024    CALCIUM 9.4 04/07/2024     (L) 04/07/2024    K 3.4 (L) 04/07/2024    CO2 12 (L) 04/07/2024      04/07/2024    BUN 71 (H) 04/07/2024    CREATININE 2.16 (H) 04/07/2024      Medications:  ipratropium-albuteroL, 3 mL, nebulization, q6h  oseltamivir, 30 mg, oral, BID       Assessment/ Plan:  Greg Bustos is a 63 y.o. year old male with a history of hypertension, CAD, PAD, COPD, hyperlipidemia and recent CVA who presented from nursing facility for acute dyspnea and persistent diarrhea.    #Acute influenza  #Suspected LLL pneumonia  #Acute hypoxia, secondary above  #Leukocytosis  -Tamiflu ordered.    -Empiric Rocephin/azithromycin.  Pneumonia antigens and Pro-Desmond ordered.  -DuoNebs and supportive care as needed.  -ER planning for CTA PE, follow up results.     #Diarrhea, likely gastroenteritis  -Recent CT abdomen/pelvis reviewed.  Monitor and if worsening repeat imaging.  -C. difficile and stool pathogen PCR ordered.  -Supportive care, ideally would like to avoid Imodium until above PCR results.    #JONAH, likely prerenal 2/2 diarrhea  -Received 1 L NS bolus.  Will continue gentle IV fluid.  -Hold home diuretics and nephrotoxic agents.    -If worsening, consider nephrology consult.    #Essential hypertension  #PAD  #CAD  #CVA  #COPD, not in exacerbation  -Reconcile home medications once med rec complete.    Diet: Regular after bedside swallow  IVF: LR 75 cc/h  Dispo: Obs    Please await final attending attestation.     Saurabh Ferrera, DO   Internal Medicine PGY-3

## 2024-04-08 PROBLEM — R06.02 SHORT OF BREATH ON EXERTION: Status: ACTIVE | Noted: 2024-04-08

## 2024-04-08 LAB
ANION GAP SERPL CALC-SCNC: 17 MMOL/L (ref 10–20)
ATRIAL RATE: 68 BPM
BUN SERPL-MCNC: 55 MG/DL (ref 6–23)
CALCIUM SERPL-MCNC: 8.5 MG/DL (ref 8.6–10.3)
CHLORIDE SERPL-SCNC: 103 MMOL/L (ref 98–107)
CO2 SERPL-SCNC: 15 MMOL/L (ref 21–32)
CREAT SERPL-MCNC: 1.41 MG/DL (ref 0.5–1.3)
EGFRCR SERPLBLD CKD-EPI 2021: 55 ML/MIN/1.73M*2
ERYTHROCYTE [DISTWIDTH] IN BLOOD BY AUTOMATED COUNT: 13.3 % (ref 11.5–14.5)
GLUCOSE SERPL-MCNC: 113 MG/DL (ref 74–99)
HCT VFR BLD AUTO: 38.2 % (ref 41–52)
HGB BLD-MCNC: 13.1 G/DL (ref 13.5–17.5)
HOLD SPECIMEN: NORMAL
MAGNESIUM SERPL-MCNC: 2.7 MG/DL (ref 1.6–2.4)
MCH RBC QN AUTO: 26.5 PG (ref 26–34)
MCHC RBC AUTO-ENTMCNC: 34.3 G/DL (ref 32–36)
MCV RBC AUTO: 77 FL (ref 80–100)
NRBC BLD-RTO: 0 /100 WBCS (ref 0–0)
P AXIS: 59 DEGREES
P OFFSET: 155 MS
P ONSET: 124 MS
PLATELET # BLD AUTO: 356 X10*3/UL (ref 150–450)
POTASSIUM SERPL-SCNC: 2.9 MMOL/L (ref 3.5–5.3)
PR INTERVAL: 170 MS
PROCALCITONIN SERPL-MCNC: 0.08 NG/ML
Q ONSET: 209 MS
QRS COUNT: 11 BEATS
QRS DURATION: 88 MS
QT INTERVAL: 454 MS
QTC CALCULATION(BAZETT): 482 MS
QTC FREDERICIA: 473 MS
R AXIS: -41 DEGREES
RBC # BLD AUTO: 4.95 X10*6/UL (ref 4.5–5.9)
SODIUM SERPL-SCNC: 132 MMOL/L (ref 136–145)
T AXIS: 106 DEGREES
T OFFSET: 436 MS
VENTRICULAR RATE: 68 BPM
WBC # BLD AUTO: 12.6 X10*3/UL (ref 4.4–11.3)

## 2024-04-08 PROCEDURE — 2500000004 HC RX 250 GENERAL PHARMACY W/ HCPCS (ALT 636 FOR OP/ED): Performed by: NURSE PRACTITIONER

## 2024-04-08 PROCEDURE — 80048 BASIC METABOLIC PNL TOTAL CA: CPT | Performed by: STUDENT IN AN ORGANIZED HEALTH CARE EDUCATION/TRAINING PROGRAM

## 2024-04-08 PROCEDURE — 83735 ASSAY OF MAGNESIUM: CPT | Performed by: STUDENT IN AN ORGANIZED HEALTH CARE EDUCATION/TRAINING PROGRAM

## 2024-04-08 PROCEDURE — 94640 AIRWAY INHALATION TREATMENT: CPT

## 2024-04-08 PROCEDURE — 87449 NOS EACH ORGANISM AG IA: CPT | Mod: PARLAB | Performed by: STUDENT IN AN ORGANIZED HEALTH CARE EDUCATION/TRAINING PROGRAM

## 2024-04-08 PROCEDURE — 84145 PROCALCITONIN (PCT): CPT | Mod: PARLAB | Performed by: STUDENT IN AN ORGANIZED HEALTH CARE EDUCATION/TRAINING PROGRAM

## 2024-04-08 PROCEDURE — 2500000001 HC RX 250 WO HCPCS SELF ADMINISTERED DRUGS (ALT 637 FOR MEDICARE OP): Performed by: STUDENT IN AN ORGANIZED HEALTH CARE EDUCATION/TRAINING PROGRAM

## 2024-04-08 PROCEDURE — 85027 COMPLETE CBC AUTOMATED: CPT | Performed by: STUDENT IN AN ORGANIZED HEALTH CARE EDUCATION/TRAINING PROGRAM

## 2024-04-08 PROCEDURE — 1100000001 HC PRIVATE ROOM DAILY

## 2024-04-08 PROCEDURE — 2500000002 HC RX 250 W HCPCS SELF ADMINISTERED DRUGS (ALT 637 FOR MEDICARE OP, ALT 636 FOR OP/ED): Performed by: STUDENT IN AN ORGANIZED HEALTH CARE EDUCATION/TRAINING PROGRAM

## 2024-04-08 PROCEDURE — 2500000001 HC RX 250 WO HCPCS SELF ADMINISTERED DRUGS (ALT 637 FOR MEDICARE OP): Performed by: NURSE PRACTITIONER

## 2024-04-08 PROCEDURE — 2500000004 HC RX 250 GENERAL PHARMACY W/ HCPCS (ALT 636 FOR OP/ED): Performed by: STUDENT IN AN ORGANIZED HEALTH CARE EDUCATION/TRAINING PROGRAM

## 2024-04-08 PROCEDURE — 36415 COLL VENOUS BLD VENIPUNCTURE: CPT | Performed by: STUDENT IN AN ORGANIZED HEALTH CARE EDUCATION/TRAINING PROGRAM

## 2024-04-08 PROCEDURE — 2500000002 HC RX 250 W HCPCS SELF ADMINISTERED DRUGS (ALT 637 FOR MEDICARE OP, ALT 636 FOR OP/ED): Performed by: NURSE PRACTITIONER

## 2024-04-08 PROCEDURE — 99231 SBSQ HOSP IP/OBS SF/LOW 25: CPT | Performed by: NURSE PRACTITIONER

## 2024-04-08 PROCEDURE — 87899 AGENT NOS ASSAY W/OPTIC: CPT | Mod: PARLAB | Performed by: STUDENT IN AN ORGANIZED HEALTH CARE EDUCATION/TRAINING PROGRAM

## 2024-04-08 RX ORDER — IBUPROFEN 400 MG/1
400 TABLET ORAL EVERY 6 HOURS PRN
Status: DISCONTINUED | OUTPATIENT
Start: 2024-04-08 | End: 2024-04-09 | Stop reason: HOSPADM

## 2024-04-08 RX ORDER — POTASSIUM CHLORIDE 14.9 MG/ML
20 INJECTION INTRAVENOUS ONCE
Status: COMPLETED | OUTPATIENT
Start: 2024-04-08 | End: 2024-04-08

## 2024-04-08 RX ORDER — POTASSIUM CHLORIDE 20 MEQ/1
40 TABLET, EXTENDED RELEASE ORAL ONCE
Status: COMPLETED | OUTPATIENT
Start: 2024-04-08 | End: 2024-04-08

## 2024-04-08 RX ADMIN — ATORVASTATIN CALCIUM 80 MG: 80 TABLET, FILM COATED ORAL at 21:13

## 2024-04-08 RX ADMIN — CARVEDILOL 25 MG: 25 TABLET, FILM COATED ORAL at 17:48

## 2024-04-08 RX ADMIN — CARVEDILOL 25 MG: 25 TABLET, FILM COATED ORAL at 09:23

## 2024-04-08 RX ADMIN — IPRATROPIUM BROMIDE AND ALBUTEROL SULFATE 3 ML: .5; 3 SOLUTION RESPIRATORY (INHALATION) at 18:37

## 2024-04-08 RX ADMIN — AMLODIPINE BESYLATE 10 MG: 10 TABLET ORAL at 09:23

## 2024-04-08 RX ADMIN — CEFTRIAXONE SODIUM 1 G: 1 INJECTION, SOLUTION INTRAVENOUS at 18:06

## 2024-04-08 RX ADMIN — ASPIRIN 81 MG CHEWABLE TABLET 81 MG: 81 TABLET CHEWABLE at 09:23

## 2024-04-08 RX ADMIN — SODIUM CHLORIDE, POTASSIUM CHLORIDE, SODIUM LACTATE AND CALCIUM CHLORIDE 75 ML/HR: 600; 310; 30; 20 INJECTION, SOLUTION INTRAVENOUS at 11:11

## 2024-04-08 RX ADMIN — IBUPROFEN 400 MG: 400 TABLET ORAL at 18:23

## 2024-04-08 RX ADMIN — MIRTAZAPINE 7.5 MG: 15 TABLET, FILM COATED ORAL at 21:13

## 2024-04-08 RX ADMIN — IPRATROPIUM BROMIDE AND ALBUTEROL SULFATE 3 ML: .5; 3 SOLUTION RESPIRATORY (INHALATION) at 12:54

## 2024-04-08 RX ADMIN — POTASSIUM CHLORIDE 40 MEQ: 1500 TABLET, EXTENDED RELEASE ORAL at 12:03

## 2024-04-08 RX ADMIN — OSELTAMAVIR PHOSPHATE 30 MG: 30 CAPSULE ORAL at 09:23

## 2024-04-08 RX ADMIN — TAMSULOSIN HYDROCHLORIDE 0.4 MG: 0.4 CAPSULE ORAL at 09:23

## 2024-04-08 RX ADMIN — METHYLPREDNISOLONE SODIUM SUCCINATE 40 MG: 40 INJECTION, POWDER, FOR SOLUTION INTRAMUSCULAR; INTRAVENOUS at 17:48

## 2024-04-08 RX ADMIN — METHYLPREDNISOLONE SODIUM SUCCINATE 40 MG: 40 INJECTION, POWDER, FOR SOLUTION INTRAMUSCULAR; INTRAVENOUS at 09:32

## 2024-04-08 RX ADMIN — METHOCARBAMOL 500 MG: 500 TABLET ORAL at 21:15

## 2024-04-08 RX ADMIN — METHOCARBAMOL 500 MG: 500 TABLET ORAL at 12:14

## 2024-04-08 RX ADMIN — OSELTAMAVIR PHOSPHATE 30 MG: 30 CAPSULE ORAL at 00:27

## 2024-04-08 RX ADMIN — OSELTAMAVIR PHOSPHATE 30 MG: 30 CAPSULE ORAL at 21:13

## 2024-04-08 RX ADMIN — POTASSIUM CHLORIDE 20 MEQ: 14.9 INJECTION, SOLUTION INTRAVENOUS at 12:03

## 2024-04-08 RX ADMIN — AZITHROMYCIN DIHYDRATE 500 MG: 500 INJECTION, POWDER, LYOPHILIZED, FOR SOLUTION INTRAVENOUS at 21:15

## 2024-04-08 SDOH — SOCIAL STABILITY: SOCIAL INSECURITY: WERE YOU ABLE TO COMPLETE ALL THE BEHAVIORAL HEALTH SCREENINGS?: YES

## 2024-04-08 SDOH — HEALTH STABILITY: MENTAL HEALTH: HOW MANY STANDARD DRINKS CONTAINING ALCOHOL DO YOU HAVE ON A TYPICAL DAY?: PATIENT DOES NOT DRINK

## 2024-04-08 SDOH — SOCIAL STABILITY: SOCIAL INSECURITY: ARE THERE ANY APPARENT SIGNS OF INJURIES/BEHAVIORS THAT COULD BE RELATED TO ABUSE/NEGLECT?: NO

## 2024-04-08 SDOH — SOCIAL STABILITY: SOCIAL INSECURITY: ABUSE: ADULT

## 2024-04-08 SDOH — SOCIAL STABILITY: SOCIAL INSECURITY: ARE YOU OR HAVE YOU BEEN THREATENED OR ABUSED PHYSICALLY, EMOTIONALLY, OR SEXUALLY BY ANYONE?: NO

## 2024-04-08 SDOH — HEALTH STABILITY: MENTAL HEALTH: HOW OFTEN DO YOU HAVE 6 OR MORE DRINKS ON ONE OCCASION?: NEVER

## 2024-04-08 SDOH — SOCIAL STABILITY: SOCIAL INSECURITY: DO YOU FEEL ANYONE HAS EXPLOITED OR TAKEN ADVANTAGE OF YOU FINANCIALLY OR OF YOUR PERSONAL PROPERTY?: NO

## 2024-04-08 SDOH — SOCIAL STABILITY: SOCIAL INSECURITY: DOES ANYONE TRY TO KEEP YOU FROM HAVING/CONTACTING OTHER FRIENDS OR DOING THINGS OUTSIDE YOUR HOME?: NO

## 2024-04-08 SDOH — SOCIAL STABILITY: SOCIAL INSECURITY: DO YOU FEEL UNSAFE GOING BACK TO THE PLACE WHERE YOU ARE LIVING?: NO

## 2024-04-08 SDOH — HEALTH STABILITY: MENTAL HEALTH: HOW OFTEN DO YOU HAVE A DRINK CONTAINING ALCOHOL?: NEVER

## 2024-04-08 SDOH — SOCIAL STABILITY: SOCIAL INSECURITY: HAS ANYONE EVER THREATENED TO HURT YOUR FAMILY OR YOUR PETS?: NO

## 2024-04-08 SDOH — SOCIAL STABILITY: SOCIAL INSECURITY: HAVE YOU HAD THOUGHTS OF HARMING ANYONE ELSE?: NO

## 2024-04-08 ASSESSMENT — PAIN - FUNCTIONAL ASSESSMENT
PAIN_FUNCTIONAL_ASSESSMENT: 0-10

## 2024-04-08 ASSESSMENT — COGNITIVE AND FUNCTIONAL STATUS - GENERAL
DRESSING REGULAR UPPER BODY CLOTHING: A LOT
CLIMB 3 TO 5 STEPS WITH RAILING: A LOT
STANDING UP FROM CHAIR USING ARMS: A LOT
WALKING IN HOSPITAL ROOM: A LOT
MOBILITY SCORE: 15
WALKING IN HOSPITAL ROOM: A LOT
DAILY ACTIVITIY SCORE: 19
MOBILITY SCORE: 13
HELP NEEDED FOR BATHING: A LOT
TOILETING: A LITTLE
TURNING FROM BACK TO SIDE WHILE IN FLAT BAD: A LOT
STANDING UP FROM CHAIR USING ARMS: A LOT
MOVING TO AND FROM BED TO CHAIR: A LITTLE
DAILY ACTIVITIY SCORE: 13
CLIMB 3 TO 5 STEPS WITH RAILING: A LOT
PERSONAL GROOMING: A LOT
DRESSING REGULAR LOWER BODY CLOTHING: A LITTLE
MOVING FROM LYING ON BACK TO SITTING ON SIDE OF FLAT BED WITH BEDRAILS: A LITTLE
EATING MEALS: A LITTLE
HELP NEEDED FOR BATHING: A LITTLE
TOILETING: A LOT
DRESSING REGULAR LOWER BODY CLOTHING: A LOT
DRESSING REGULAR UPPER BODY CLOTHING: A LITTLE
MOVING TO AND FROM BED TO CHAIR: A LOT
TURNING FROM BACK TO SIDE WHILE IN FLAT BAD: A LITTLE
PERSONAL GROOMING: A LITTLE
PATIENT BASELINE BEDBOUND: NO
MOVING FROM LYING ON BACK TO SITTING ON SIDE OF FLAT BED WITH BEDRAILS: A LITTLE

## 2024-04-08 ASSESSMENT — LIFESTYLE VARIABLES
SKIP TO QUESTIONS 9-10: 1
HOW OFTEN DO YOU HAVE 6 OR MORE DRINKS ON ONE OCCASION: NEVER
AUDIT-C TOTAL SCORE: 0
AUDIT-C TOTAL SCORE: 0
HOW OFTEN DO YOU HAVE A DRINK CONTAINING ALCOHOL: NEVER
AUDIT-C TOTAL SCORE: 0
HOW MANY STANDARD DRINKS CONTAINING ALCOHOL DO YOU HAVE ON A TYPICAL DAY: PATIENT DOES NOT DRINK
SKIP TO QUESTIONS 9-10: 1

## 2024-04-08 ASSESSMENT — PATIENT HEALTH QUESTIONNAIRE - PHQ9
2. FEELING DOWN, DEPRESSED OR HOPELESS: NOT AT ALL
1. LITTLE INTEREST OR PLEASURE IN DOING THINGS: NOT AT ALL
SUM OF ALL RESPONSES TO PHQ9 QUESTIONS 1 & 2: 0

## 2024-04-08 ASSESSMENT — ACTIVITIES OF DAILY LIVING (ADL)
BATHING: NEEDS ASSISTANCE
FEEDING YOURSELF: NEEDS ASSISTANCE
LACK_OF_TRANSPORTATION: NO
PATIENT'S MEMORY ADEQUATE TO SAFELY COMPLETE DAILY ACTIVITIES?: YES
ADEQUATE_TO_COMPLETE_ADL: YES
DRESSING YOURSELF: NEEDS ASSISTANCE
HEARING - RIGHT EAR: FUNCTIONAL
JUDGMENT_ADEQUATE_SAFELY_COMPLETE_DAILY_ACTIVITIES: YES
WALKS IN HOME: NEEDS ASSISTANCE
HEARING - LEFT EAR: FUNCTIONAL
TOILETING: NEEDS ASSISTANCE
GROOMING: NEEDS ASSISTANCE

## 2024-04-08 ASSESSMENT — PAIN SCALES - GENERAL
PAINLEVEL_OUTOF10: 2
PAINLEVEL_OUTOF10: 0 - NO PAIN
PAINLEVEL_OUTOF10: 6

## 2024-04-08 ASSESSMENT — PAIN DESCRIPTION - LOCATION: LOCATION: HEAD

## 2024-04-08 NOTE — TREATMENT PLAN
I spoke with the patient about his plan of care. He is agreeable to taking medicines and having testing done. He is alert and oriented x3. I also discussed his code status with him. He would like to be a DNRCC. I also spoke with his daughter Sylvie Bustos ([phone]) who confirmed his code status. I also updated her on his plan of care.

## 2024-04-09 VITALS
HEART RATE: 63 BPM | HEIGHT: 65 IN | BODY MASS INDEX: 26.66 KG/M2 | SYSTOLIC BLOOD PRESSURE: 124 MMHG | RESPIRATION RATE: 20 BRPM | DIASTOLIC BLOOD PRESSURE: 74 MMHG | TEMPERATURE: 96.4 F | OXYGEN SATURATION: 93 % | WEIGHT: 160 LBS

## 2024-04-09 LAB
LEGIONELLA AG UR QL: NEGATIVE
S PNEUM AG UR QL: NEGATIVE

## 2024-04-09 PROCEDURE — 2500000002 HC RX 250 W HCPCS SELF ADMINISTERED DRUGS (ALT 637 FOR MEDICARE OP, ALT 636 FOR OP/ED): Performed by: STUDENT IN AN ORGANIZED HEALTH CARE EDUCATION/TRAINING PROGRAM

## 2024-04-09 PROCEDURE — 94640 AIRWAY INHALATION TREATMENT: CPT

## 2024-04-09 PROCEDURE — 2500000001 HC RX 250 WO HCPCS SELF ADMINISTERED DRUGS (ALT 637 FOR MEDICARE OP): Performed by: STUDENT IN AN ORGANIZED HEALTH CARE EDUCATION/TRAINING PROGRAM

## 2024-04-09 PROCEDURE — 2500000004 HC RX 250 GENERAL PHARMACY W/ HCPCS (ALT 636 FOR OP/ED): Performed by: STUDENT IN AN ORGANIZED HEALTH CARE EDUCATION/TRAINING PROGRAM

## 2024-04-09 PROCEDURE — 2500000004 HC RX 250 GENERAL PHARMACY W/ HCPCS (ALT 636 FOR OP/ED): Performed by: NURSE PRACTITIONER

## 2024-04-09 PROCEDURE — S4991 NICOTINE PATCH NONLEGEND: HCPCS | Performed by: STUDENT IN AN ORGANIZED HEALTH CARE EDUCATION/TRAINING PROGRAM

## 2024-04-09 RX ORDER — PREDNISONE 5 MG/1
5 TABLET ORAL 2 TIMES DAILY
Qty: 20 TABLET | Refills: 0 | Status: SHIPPED | OUTPATIENT
Start: 2024-04-09 | End: 2024-04-09 | Stop reason: SDUPTHER

## 2024-04-09 RX ORDER — PREDNISONE 5 MG/1
5 TABLET ORAL 2 TIMES DAILY
Status: DISCONTINUED | OUTPATIENT
Start: 2024-04-09 | End: 2024-04-09 | Stop reason: HOSPADM

## 2024-04-09 RX ORDER — IPRATROPIUM BROMIDE AND ALBUTEROL SULFATE 2.5; .5 MG/3ML; MG/3ML
3 SOLUTION RESPIRATORY (INHALATION) 2 TIMES DAILY
Qty: 180 ML | Refills: 11 | Status: SHIPPED | OUTPATIENT
Start: 2024-04-09

## 2024-04-09 RX ORDER — PREDNISONE 5 MG/1
5 TABLET ORAL 2 TIMES DAILY
Qty: 20 TABLET | Refills: 0 | Status: SHIPPED | OUTPATIENT
Start: 2024-04-09 | End: 2024-04-19

## 2024-04-09 RX ADMIN — AMLODIPINE BESYLATE 10 MG: 10 TABLET ORAL at 08:56

## 2024-04-09 RX ADMIN — METHYLPREDNISOLONE SODIUM SUCCINATE 40 MG: 40 INJECTION, POWDER, FOR SOLUTION INTRAMUSCULAR; INTRAVENOUS at 13:37

## 2024-04-09 RX ADMIN — CARVEDILOL 25 MG: 25 TABLET, FILM COATED ORAL at 08:56

## 2024-04-09 RX ADMIN — ASPIRIN 81 MG CHEWABLE TABLET 81 MG: 81 TABLET CHEWABLE at 08:56

## 2024-04-09 RX ADMIN — OSELTAMAVIR PHOSPHATE 30 MG: 30 CAPSULE ORAL at 08:56

## 2024-04-09 RX ADMIN — IPRATROPIUM BROMIDE AND ALBUTEROL SULFATE 3 ML: .5; 3 SOLUTION RESPIRATORY (INHALATION) at 11:40

## 2024-04-09 RX ADMIN — IPRATROPIUM BROMIDE AND ALBUTEROL SULFATE 3 ML: .5; 3 SOLUTION RESPIRATORY (INHALATION) at 04:18

## 2024-04-09 RX ADMIN — METHOCARBAMOL 500 MG: 500 TABLET ORAL at 13:47

## 2024-04-09 RX ADMIN — SODIUM CHLORIDE, POTASSIUM CHLORIDE, SODIUM LACTATE AND CALCIUM CHLORIDE 75 ML/HR: 600; 310; 30; 20 INJECTION, SOLUTION INTRAVENOUS at 03:43

## 2024-04-09 RX ADMIN — TAMSULOSIN HYDROCHLORIDE 0.4 MG: 0.4 CAPSULE ORAL at 08:56

## 2024-04-09 RX ADMIN — NICOTINE 1 PATCH: 14 PATCH, EXTENDED RELEASE TRANSDERMAL at 08:56

## 2024-04-09 RX ADMIN — PANTOPRAZOLE SODIUM 40 MG: 40 TABLET, DELAYED RELEASE ORAL at 06:54

## 2024-04-09 RX ADMIN — METHYLPREDNISOLONE SODIUM SUCCINATE 40 MG: 40 INJECTION, POWDER, FOR SOLUTION INTRAMUSCULAR; INTRAVENOUS at 06:54

## 2024-04-09 RX ADMIN — IPRATROPIUM BROMIDE AND ALBUTEROL SULFATE 3 ML: .5; 3 SOLUTION RESPIRATORY (INHALATION) at 06:58

## 2024-04-09 RX ADMIN — CARVEDILOL 25 MG: 25 TABLET, FILM COATED ORAL at 16:04

## 2024-04-09 ASSESSMENT — COGNITIVE AND FUNCTIONAL STATUS - GENERAL
MOVING TO AND FROM BED TO CHAIR: A LOT
HELP NEEDED FOR BATHING: A LOT
MOBILITY SCORE: 11
PERSONAL GROOMING: A LOT
TURNING FROM BACK TO SIDE WHILE IN FLAT BAD: A LOT
DRESSING REGULAR UPPER BODY CLOTHING: A LOT
DRESSING REGULAR LOWER BODY CLOTHING: A LOT
EATING MEALS: A LITTLE
STANDING UP FROM CHAIR USING ARMS: A LOT
DAILY ACTIVITIY SCORE: 13
MOVING FROM LYING ON BACK TO SITTING ON SIDE OF FLAT BED WITH BEDRAILS: A LITTLE
CLIMB 3 TO 5 STEPS WITH RAILING: TOTAL
WALKING IN HOSPITAL ROOM: TOTAL
TOILETING: A LOT

## 2024-04-09 ASSESSMENT — PAIN SCALES - GENERAL: PAINLEVEL_OUTOF10: 0 - NO PAIN

## 2024-04-09 NOTE — PROGRESS NOTES
Greg Bustos is a 63 y.o. male on day 1 of admission presenting with Shortness of breath.      Subjective   Patient is awake and alert oriented x 2, according to nursing staff patient has not had any further diarrhea.  He was positive for influenza and is currently on treatment with Tamiflu and IV antibiotics for questionable pneumonia.   According to nursing staff patient is not hypoxic even though he has a nasal cannula on    Objective   Patient is awake and alert oriented x 2, in no distress,    Lungs bilateral clear auscultation    Heart regular S1-S2    Abdomen is soft and nontender    Extremities no edema  Last Recorded Vitals  /78   Pulse 61   Temp 36.2 °C (97.2 °F)   Resp 18   Wt 72.6 kg (160 lb)   SpO2 97%   Intake/Output last 3 Shifts:    Intake/Output Summary (Last 24 hours) at 4/9/2024 0757  Last data filed at 4/8/2024 1500  Gross per 24 hour   Intake 1443.75 ml   Output 1000 ml   Net 443.75 ml       Admission Weight  Weight: 72.6 kg (160 lb) (04/07/24 1524)    Daily Weight  04/07/24 : 72.6 kg (160 lb)    Image Results  ECG 12 lead  Normal sinus rhythm  Left axis deviation  Cannot rule out Anterior infarct , age undetermined  Abnormal ECG  When compared with ECG of 16-MAR-2023 13:55,  Minimal criteria for Anterior infarct are now Present  Nonspecific T wave abnormality, worse in Inferior leads  Nonspecific T wave abnormality, worse in Anterolateral leads  See ED provider note for full interpretation and clinical correlation  Confirmed by Treasure Laws ([id number]) on 4/8/2024 8:55:50 PM      Physical Exam    Relevant Results               Assessment/Plan    #1 influenza A, on Tamiflu, chest x-ray was negative for pneumonia and patient is in general asymptomatic and would discontinue IV antibiotics    2.  History of COPD on aerosol treatments and IV steroids    3.  History of large CVA in 2023, patient is currently resident of an assisted living, patient has very limited level of function  due to the stroke              Principal Problem:    Shortness of breath  Active Problems:    Short of breath on exertion                  Stan Rose MD

## 2024-04-09 NOTE — CARE PLAN
The patient's goals for the shift include      The clinical goals for the shift include pt will cooperate with care    Over the shift, the patient did not make progress toward the following goals. Barriers to progression include pt confused at times with dates.      Problem: Pain  Goal: My pain/discomfort is manageable  Outcome: Not Progressing     Problem: Safety  Goal: Patient will be injury free during hospitalization  Outcome: Not Progressing  Goal: I will remain free of falls  Outcome: Not Progressing     Problem: Daily Care  Goal: Daily care needs are met  Outcome: Not Progressing     Problem: Psychosocial Needs  Goal: Demonstrates ability to cope with hospitalization/illness  Outcome: Not Progressing  Goal: Collaborate with me, my family, and caregiver to identify my specific goals  Outcome: Not Progressing     Problem: Discharge Barriers  Goal: My discharge needs are met  Outcome: Not Progressing     Problem: Respiratory  Goal: Minimize anxiety/maximize coping throughout shift  Outcome: Not Progressing  Goal: Minimal/no exertional discomfort or dyspnea this shift  Outcome: Not Progressing  Goal: No signs of respiratory distress (eg. Use of accessory muscles. Peds grunting)  Outcome: Not Progressing  Goal: Patent airway maintained this shift  Outcome: Not Progressing  Goal: Verbalize decreased shortness of breath this shift  Outcome: Not Progressing  Goal: Wean oxygen to maintain O2 saturation per order/standard this shift  Outcome: Not Progressing  Goal: Increase self care and/or family involvement in next 24 hours  Outcome: Not Progressing     Problem: Skin  Goal: Decreased wound size/increased tissue granulation at next dressing change  Outcome: Not Progressing  Goal: Participates in plan/prevention/treatment measures  Outcome: Not Progressing  Goal: Prevent/manage excess moisture  Outcome: Not Progressing  Goal: Prevent/minimize sheer/friction injuries  Outcome: Not Progressing  Goal: Promote/optimize  nutrition  Outcome: Not Progressing  Goal: Promote skin healing  Outcome: Not Progressing     Problem: Pain - Adult  Goal: Verbalizes/displays adequate comfort level or baseline comfort level  Outcome: Not Progressing     Problem: Safety - Adult  Goal: Free from fall injury  Outcome: Not Progressing     Problem: Discharge Planning  Goal: Discharge to home or other facility with appropriate resources  Outcome: Not Progressing     Problem: Chronic Conditions and Co-morbidities  Goal: Patient's chronic conditions and co-morbidity symptoms are monitored and maintained or improved  Outcome: Not Progressing     Problem: Pain  Goal: Takes deep breaths with improved pain control throughout the shift  Outcome: Not Progressing  Goal: Turns in bed with improved pain control throughout the shift  Outcome: Not Progressing  Goal: Walks with improved pain control throughout the shift  Outcome: Not Progressing  Goal: Performs ADL's with improved pain control throughout shift  Outcome: Not Progressing  Goal: Participates in PT with improved pain control throughout the shift  Outcome: Not Progressing  Goal: Free from opioid side effects throughout the shift  Outcome: Not Progressing  Goal: Free from acute confusion related to pain meds throughout the shift  Outcome: Not Progressing

## 2024-05-08 PROBLEM — M51.26 HERNIATION OF LUMBAR INTERVERTEBRAL DISC WITHOUT MYELOPATHY: Status: ACTIVE | Noted: 2024-05-08

## 2024-05-08 PROBLEM — I10 ESSENTIAL HYPERTENSION: Status: ACTIVE | Noted: 2024-05-08

## 2024-05-08 PROBLEM — G25.81 RESTLESS LEG SYNDROME: Status: ACTIVE | Noted: 2024-05-08

## 2024-05-08 PROBLEM — K21.9 GERD (GASTROESOPHAGEAL REFLUX DISEASE): Status: ACTIVE | Noted: 2024-05-08

## 2024-05-08 PROBLEM — R93.89 ABNORMAL COMPUTERIZED AXIAL TOMOGRAPHY OF CHEST: Status: ACTIVE | Noted: 2024-05-08

## 2024-05-08 PROBLEM — R25.1 TREMOR: Status: ACTIVE | Noted: 2024-05-08

## 2024-05-08 PROBLEM — I63.9 CEREBROVASCULAR ACCIDENT (CVA) (MULTI): Status: ACTIVE | Noted: 2023-03-03

## 2024-05-08 PROBLEM — F44.89 CONFUSIONAL STATE: Status: ACTIVE | Noted: 2024-05-08

## 2024-05-08 PROBLEM — K52.9 CHRONIC DIARRHEA: Status: ACTIVE | Noted: 2024-05-08

## 2024-05-08 PROBLEM — R29.898 WEAKNESS OF RIGHT LOWER EXTREMITY: Status: ACTIVE | Noted: 2024-05-08

## 2024-05-08 PROBLEM — I73.9 PERIPHERAL VASCULAR DISEASE (CMS-HCC): Status: ACTIVE | Noted: 2024-05-08

## 2024-05-08 PROBLEM — I25.10 ARTERIOSCLEROSIS OF CORONARY ARTERY: Status: ACTIVE | Noted: 2024-05-08

## 2024-05-08 PROBLEM — J44.9 CHRONIC OBSTRUCTIVE PULMONARY DISEASE (COPD) (MULTI): Status: ACTIVE | Noted: 2024-05-08

## 2024-05-08 PROBLEM — Z95.5 S/P CORONARY ARTERY STENT PLACEMENT: Status: ACTIVE | Noted: 2024-05-08

## 2024-05-23 NOTE — DISCHARGE SUMMARY
Discharge Diagnosis    Influenza a  Issues Requiring Follow-Up  Influenza a  Prior hx of cva with L hemiparesis  Patient was discharged to the assisted living where he was staying at before his admission to the hospital    Discharge Meds     Your medication list        START taking these medications        Instructions Last Dose Given Next Dose Due   fluticasone-umeclidin-vilanter 100-62.5-25 mcg blister with device  Commonly known as: TRELEGY-ELLIPTA      Inhale 1 puff once daily.       predniSONE 5 mg tablet  Commonly known as: Deltasone      Take 1 tablet (5 mg) by mouth 2 times a day for 20 doses.              CHANGE how you take these medications        Instructions Last Dose Given Next Dose Due   ipratropium-albuteroL 0.5-2.5 mg/3 mL nebulizer solution  Commonly known as: Duo-Neb  What changed:   when to take this  reasons to take this      Take 3 mL by nebulization 2 times a day.              CONTINUE taking these medications        Instructions Last Dose Given Next Dose Due   amLODIPine 10 mg tablet  Commonly known as: Norvasc           aspirin 81 mg EC tablet           atorvastatin 80 mg tablet  Commonly known as: Lipitor           carvedilol 25 mg tablet  Commonly known as: Coreg           cilostazol 100 mg tablet  Commonly known as: Pletal           escitalopram 10 mg tablet  Commonly known as: Lexapro           mirtazapine 15 mg tablet  Commonly known as: Remeron           oxyCODONE-acetaminophen 5-325 mg tablet  Commonly known as: Percocet           pantoprazole 40 mg EC tablet  Commonly known as: ProtoNix                     Where to Get Your Medications        These medications were sent to [facility] [place], [place] [address] [facility] [address]      Phone: [phone]   ipratropium-albuteroL 0.5-2.5 mg/3 mL nebulizer solution       You can get these medications from any pharmacy    Bring a paper prescription for each of these  medications  fluticasone-umeclidin-vilanter 100-62.5-25 mcg blister with device  predniSONE 5 mg tablet         Test Results Pending At Discharge  Pending Labs       No current pending labs.            Hospital Course             Show:  []Written[]Templated[]Copied    Added by:  [x]Saurabh Ferrera DO    []Marixa for details  HPI:  Greg Bustos is a 63 y.o. year old male with a history of hypertension, CAD, PAD, COPD, hyperlipidemia and recent CVA who presented from nursing facility for acute dyspnea and persistent diarrhea.  Patient recently seen in the ER for profuse diarrhea, found to have acute diverticulosis without diverticulitis on imaging and was discharged home. Since discharge, he has noted increase in diarrhea and more recently dyspnea with generalized weakness.  No fevers or chills.     In the ER, vital stable.  Labs with leukocytosis (19), sodium 130, BUN 71 and creatinine 2.16 (1.1 to on 3/27 from recent discharge).  Influenza A positive.  CXR with possible left lower lobe infiltrate.  Patient given 1 L NS bolus, DuoNebs and 125 mg Solu-Medrol push in the ER.     A 10 point ROS was performed with the patient denying any complaint at this time aside from those listed in the HPI above.      Past Medical History: as above  Past Surgical History: Cholecystectomy, cardiac stent.  Family History: noncontributory  Social History: former smoker, EtOH use.     Visit Vitals  /71   Pulse 67   Temp 36 °C (96.8 °F)   Resp (!) 22      Physical Exam:   GENERAL: No apparent distress.  HEAD:  Normocephalic, atraumatic.  EYES:  Round and reactive.  ENT:  No nasal discharge, mucous membranes moist and pink.  NECK:  Atraumatic, no meningismus.  CARDIOVASCULAR:  Regular rate and rhythm, no murmur.  RESPIRATORY:  Diminished breath sounds, no active work of breathing.   ABDOMEN:  Soft, no tenderness, nondistended.  EXTREMITIES:  No peripheral edema, no calf tenderness.  SKIN:  Warm and dry.  NEUROLOGICAL:  Nonfocal  grossly.           Patient was rehydrated with IV fluid and was treated with oral Tamiflu.  His condition gradually improved and he was able to be discharged back to the assisted living in a couple of days  Pertinent Physical Exam At Time of Discharge  Physical Exam  Patient is fully awake and alert oriented x 2    Head and neck within normal limits    Lungs bilateral clear auscultation    Heart regular S1-S2    Abdomen soft and nontender    Extremities no edema    Left-sided hemiparesis  Outpatient Follow-Up  Future Appointments   Date Time Provider Department Center   11/12/2024 12:00 PM Jh Thrasher MD [id number] [place]         Stan Rose MD

## 2024-07-18 ENCOUNTER — APPOINTMENT (OUTPATIENT)
Dept: CARDIOLOGY | Facility: CLINIC | Age: 64
End: 2024-07-18
Payer: MEDICARE

## 2024-11-12 ENCOUNTER — APPOINTMENT (OUTPATIENT)
Dept: NEUROLOGY | Facility: CLINIC | Age: 64
End: 2024-11-12
Payer: MEDICARE

## 2025-02-09 ENCOUNTER — APPOINTMENT (OUTPATIENT)
Dept: RADIOLOGY | Facility: HOSPITAL | Age: 65
End: 2025-02-09
Payer: COMMERCIAL

## 2025-02-09 ENCOUNTER — APPOINTMENT (OUTPATIENT)
Dept: CARDIOLOGY | Facility: HOSPITAL | Age: 65
End: 2025-02-09
Payer: COMMERCIAL

## 2025-02-09 ENCOUNTER — HOSPITAL ENCOUNTER (INPATIENT)
Facility: HOSPITAL | Age: 65
LOS: 5 days | Discharge: NURSING, REHAB OR LONG-TERM CARE | End: 2025-02-15
Attending: STUDENT IN AN ORGANIZED HEALTH CARE EDUCATION/TRAINING PROGRAM | Admitting: INTERNAL MEDICINE
Payer: COMMERCIAL

## 2025-02-09 DIAGNOSIS — U07.1 COVID-19: ICD-10-CM

## 2025-02-09 DIAGNOSIS — R06.02 SOB (SHORTNESS OF BREATH): ICD-10-CM

## 2025-02-09 DIAGNOSIS — R07.9 CHEST PAIN, UNSPECIFIED TYPE: Primary | ICD-10-CM

## 2025-02-09 DIAGNOSIS — R19.7 DIARRHEA, UNSPECIFIED TYPE: ICD-10-CM

## 2025-02-09 DIAGNOSIS — R09.02 HYPOXIA: ICD-10-CM

## 2025-02-09 DIAGNOSIS — J44.1 CHRONIC OBSTRUCTIVE PULMONARY DISEASE WITH ACUTE EXACERBATION (MULTI): ICD-10-CM

## 2025-02-09 LAB
ANION GAP BLDV CALCULATED.4IONS-SCNC: 13 MMOL/L (ref 10–25)
BASE EXCESS BLDV CALC-SCNC: -3.2 MMOL/L (ref -2–3)
BODY TEMPERATURE: 37 DEGREES CELSIUS
CA-I BLDV-SCNC: 1.21 MMOL/L (ref 1.1–1.33)
CHLORIDE BLDV-SCNC: 105 MMOL/L (ref 98–107)
GLUCOSE BLDV-MCNC: 119 MG/DL (ref 74–99)
HCO3 BLDV-SCNC: 21.2 MMOL/L (ref 22–26)
HCT VFR BLD EST: 39 % (ref 41–52)
HGB BLDV-MCNC: 13 G/DL (ref 13.5–17.5)
INHALED O2 CONCENTRATION: 36 %
LACTATE BLDV-SCNC: 1.5 MMOL/L (ref 0.4–2)
OXYHGB MFR BLDV: 72.2 % (ref 45–75)
PCO2 BLDV: 35 MM HG (ref 41–51)
PH BLDV: 7.39 PH (ref 7.33–7.43)
PO2 BLDV: 44 MM HG (ref 35–45)
POTASSIUM BLDV-SCNC: 4.2 MMOL/L (ref 3.5–5.3)
SAO2 % BLDV: 74 % (ref 45–75)
SODIUM BLDV-SCNC: 135 MMOL/L (ref 136–145)

## 2025-02-09 PROCEDURE — 87637 SARSCOV2&INF A&B&RSV AMP PRB: CPT | Performed by: NURSE PRACTITIONER

## 2025-02-09 PROCEDURE — 85027 COMPLETE CBC AUTOMATED: CPT | Performed by: NURSE PRACTITIONER

## 2025-02-09 PROCEDURE — 93005 ELECTROCARDIOGRAM TRACING: CPT

## 2025-02-09 PROCEDURE — 82947 ASSAY GLUCOSE BLOOD QUANT: CPT | Performed by: NURSE PRACTITIONER

## 2025-02-09 PROCEDURE — 84484 ASSAY OF TROPONIN QUANT: CPT | Performed by: NURSE PRACTITIONER

## 2025-02-09 PROCEDURE — 80053 COMPREHEN METABOLIC PANEL: CPT | Performed by: NURSE PRACTITIONER

## 2025-02-09 PROCEDURE — 71045 X-RAY EXAM CHEST 1 VIEW: CPT

## 2025-02-09 PROCEDURE — 83735 ASSAY OF MAGNESIUM: CPT | Performed by: NURSE PRACTITIONER

## 2025-02-09 PROCEDURE — 36415 COLL VENOUS BLD VENIPUNCTURE: CPT | Performed by: NURSE PRACTITIONER

## 2025-02-09 PROCEDURE — 85007 BL SMEAR W/DIFF WBC COUNT: CPT | Performed by: NURSE PRACTITIONER

## 2025-02-09 PROCEDURE — 71045 X-RAY EXAM CHEST 1 VIEW: CPT | Performed by: RADIOLOGY

## 2025-02-09 PROCEDURE — 99285 EMERGENCY DEPT VISIT HI MDM: CPT | Performed by: STUDENT IN AN ORGANIZED HEALTH CARE EDUCATION/TRAINING PROGRAM

## 2025-02-10 PROBLEM — R07.9 CHEST PAIN, UNSPECIFIED TYPE: Status: ACTIVE | Noted: 2025-02-10

## 2025-02-10 LAB
ALBUMIN SERPL BCP-MCNC: 3.9 G/DL (ref 3.4–5)
ALP SERPL-CCNC: 80 U/L (ref 33–136)
ALT SERPL W P-5'-P-CCNC: 26 U/L (ref 10–52)
ANION GAP SERPL CALC-SCNC: 15 MMOL/L (ref 10–20)
ANION GAP SERPL CALC-SCNC: 15 MMOL/L (ref 10–20)
APTT PPP: 35 SECONDS (ref 27–38)
AST SERPL W P-5'-P-CCNC: 40 U/L (ref 9–39)
ATRIAL RATE: 70 BPM
BASOPHILS # BLD MANUAL: 0 X10*3/UL (ref 0–0.1)
BASOPHILS NFR BLD MANUAL: 0 %
BILIRUB SERPL-MCNC: 0.5 MG/DL (ref 0–1.2)
BNP SERPL-MCNC: 20 PG/ML (ref 0–99)
BUN SERPL-MCNC: 14 MG/DL (ref 6–23)
BUN SERPL-MCNC: 15 MG/DL (ref 6–23)
BURR CELLS BLD QL SMEAR: NORMAL
CALCIUM SERPL-MCNC: 8.6 MG/DL (ref 8.6–10.3)
CALCIUM SERPL-MCNC: 9 MG/DL (ref 8.6–10.3)
CARDIAC TROPONIN I PNL SERPL HS: 4 NG/L (ref 0–20)
CARDIAC TROPONIN I PNL SERPL HS: 5 NG/L (ref 0–20)
CARDIAC TROPONIN I PNL SERPL HS: 6 NG/L (ref 0–20)
CHLORIDE SERPL-SCNC: 104 MMOL/L (ref 98–107)
CHLORIDE SERPL-SCNC: 107 MMOL/L (ref 98–107)
CHOLEST SERPL-MCNC: 108 MG/DL (ref 0–199)
CHOLESTEROL/HDL RATIO: 3.2
CO2 SERPL-SCNC: 20 MMOL/L (ref 21–32)
CO2 SERPL-SCNC: 21 MMOL/L (ref 21–32)
CREAT SERPL-MCNC: 1.07 MG/DL (ref 0.5–1.3)
CREAT SERPL-MCNC: 1.14 MG/DL (ref 0.5–1.3)
EGFRCR SERPLBLD CKD-EPI 2021: 71 ML/MIN/1.73M*2
EGFRCR SERPLBLD CKD-EPI 2021: 77 ML/MIN/1.73M*2
EOSINOPHIL # BLD MANUAL: 0.14 X10*3/UL (ref 0–0.7)
EOSINOPHIL NFR BLD MANUAL: 2 %
ERYTHROCYTE [DISTWIDTH] IN BLOOD BY AUTOMATED COUNT: 15.9 % (ref 11.5–14.5)
ERYTHROCYTE [DISTWIDTH] IN BLOOD BY AUTOMATED COUNT: 16 % (ref 11.5–14.5)
FLUAV RNA RESP QL NAA+PROBE: NOT DETECTED
FLUBV RNA RESP QL NAA+PROBE: NOT DETECTED
GLUCOSE SERPL-MCNC: 105 MG/DL (ref 74–99)
GLUCOSE SERPL-MCNC: 96 MG/DL (ref 74–99)
HCT VFR BLD AUTO: 38.6 % (ref 41–52)
HCT VFR BLD AUTO: 40.2 % (ref 41–52)
HDLC SERPL-MCNC: 33.9 MG/DL
HGB BLD-MCNC: 12.2 G/DL (ref 13.5–17.5)
HGB BLD-MCNC: 13.1 G/DL (ref 13.5–17.5)
IMM GRANULOCYTES # BLD AUTO: 0.09 X10*3/UL (ref 0–0.7)
IMM GRANULOCYTES NFR BLD AUTO: 1.3 % (ref 0–0.9)
INR PPP: 1.1 (ref 0.9–1.1)
LDLC SERPL CALC-MCNC: 43 MG/DL
LYMPHOCYTES # BLD MANUAL: 2.23 X10*3/UL (ref 1.2–4.8)
LYMPHOCYTES NFR BLD MANUAL: 31 %
MAGNESIUM SERPL-MCNC: 2.18 MG/DL (ref 1.6–2.4)
MCH RBC QN AUTO: 25.5 PG (ref 26–34)
MCH RBC QN AUTO: 26.3 PG (ref 26–34)
MCHC RBC AUTO-ENTMCNC: 31.6 G/DL (ref 32–36)
MCHC RBC AUTO-ENTMCNC: 32.6 G/DL (ref 32–36)
MCV RBC AUTO: 81 FL (ref 80–100)
MCV RBC AUTO: 81 FL (ref 80–100)
MONOCYTES # BLD MANUAL: 0.58 X10*3/UL (ref 0.1–1)
MONOCYTES NFR BLD MANUAL: 8 %
NEUTROPHILS # BLD MANUAL: 4.25 X10*3/UL (ref 1.2–7.7)
NEUTS BAND # BLD MANUAL: 0.43 X10*3/UL (ref 0–0.7)
NEUTS BAND NFR BLD MANUAL: 6 %
NEUTS SEG # BLD MANUAL: 3.82 X10*3/UL (ref 1.2–7)
NEUTS SEG NFR BLD MANUAL: 53 %
NON HDL CHOLESTEROL: 74 MG/DL (ref 0–149)
NRBC BLD-RTO: 0 /100 WBCS (ref 0–0)
NRBC BLD-RTO: 0 /100 WBCS (ref 0–0)
P AXIS: 37 DEGREES
P OFFSET: 173 MS
P ONSET: 125 MS
PLATELET # BLD AUTO: 304 X10*3/UL (ref 150–450)
PLATELET # BLD AUTO: 338 X10*3/UL (ref 150–450)
POTASSIUM SERPL-SCNC: 3.4 MMOL/L (ref 3.5–5.3)
POTASSIUM SERPL-SCNC: 3.5 MMOL/L (ref 3.5–5.3)
POTASSIUM SERPL-SCNC: 5.4 MMOL/L (ref 3.5–5.3)
PR INTERVAL: 176 MS
PROT SERPL-MCNC: 8.2 G/DL (ref 6.4–8.2)
PROTHROMBIN TIME: 12.8 SECONDS (ref 9.8–12.8)
Q ONSET: 213 MS
QRS COUNT: 12 BEATS
QRS DURATION: 92 MS
QT INTERVAL: 554 MS
QTC CALCULATION(BAZETT): 598 MS
QTC FREDERICIA: 583 MS
R AXIS: -32 DEGREES
RBC # BLD AUTO: 4.79 X10*6/UL (ref 4.5–5.9)
RBC # BLD AUTO: 4.98 X10*6/UL (ref 4.5–5.9)
RBC MORPH BLD: NORMAL
RSV RNA RESP QL NAA+PROBE: NOT DETECTED
SARS-COV-2 RNA RESP QL NAA+PROBE: DETECTED
SODIUM SERPL-SCNC: 135 MMOL/L (ref 136–145)
SODIUM SERPL-SCNC: 139 MMOL/L (ref 136–145)
T AXIS: 56 DEGREES
T OFFSET: 490 MS
TOTAL CELLS COUNTED BLD: 100
TRIGL SERPL-MCNC: 155 MG/DL (ref 0–149)
VENTRICULAR RATE: 70 BPM
VLDL: 31 MG/DL (ref 0–40)
WBC # BLD AUTO: 5.7 X10*3/UL (ref 4.4–11.3)
WBC # BLD AUTO: 7.2 X10*3/UL (ref 4.4–11.3)

## 2025-02-10 PROCEDURE — 85027 COMPLETE CBC AUTOMATED: CPT | Performed by: NURSE PRACTITIONER

## 2025-02-10 PROCEDURE — 80061 LIPID PANEL: CPT | Performed by: NURSE PRACTITIONER

## 2025-02-10 PROCEDURE — 85730 THROMBOPLASTIN TIME PARTIAL: CPT | Performed by: NURSE PRACTITIONER

## 2025-02-10 PROCEDURE — 36415 COLL VENOUS BLD VENIPUNCTURE: CPT | Performed by: NURSE PRACTITIONER

## 2025-02-10 PROCEDURE — 84132 ASSAY OF SERUM POTASSIUM: CPT | Performed by: NURSE PRACTITIONER

## 2025-02-10 PROCEDURE — 94640 AIRWAY INHALATION TREATMENT: CPT

## 2025-02-10 PROCEDURE — 97161 PT EVAL LOW COMPLEX 20 MIN: CPT | Mod: GP

## 2025-02-10 PROCEDURE — 97165 OT EVAL LOW COMPLEX 30 MIN: CPT | Mod: GO

## 2025-02-10 PROCEDURE — 84484 ASSAY OF TROPONIN QUANT: CPT | Performed by: NURSE PRACTITIONER

## 2025-02-10 PROCEDURE — 2500000001 HC RX 250 WO HCPCS SELF ADMINISTERED DRUGS (ALT 637 FOR MEDICARE OP): Performed by: NURSE PRACTITIONER

## 2025-02-10 PROCEDURE — 80048 BASIC METABOLIC PNL TOTAL CA: CPT | Performed by: NURSE PRACTITIONER

## 2025-02-10 PROCEDURE — 1200000002 HC GENERAL ROOM WITH TELEMETRY DAILY

## 2025-02-10 PROCEDURE — 99222 1ST HOSP IP/OBS MODERATE 55: CPT | Performed by: NURSE PRACTITIONER

## 2025-02-10 PROCEDURE — 2500000004 HC RX 250 GENERAL PHARMACY W/ HCPCS (ALT 636 FOR OP/ED): Performed by: NURSE PRACTITIONER

## 2025-02-10 PROCEDURE — 83880 ASSAY OF NATRIURETIC PEPTIDE: CPT | Performed by: NURSE PRACTITIONER

## 2025-02-10 PROCEDURE — 3E0333Z INTRODUCTION OF ANTI-INFLAMMATORY INTO PERIPHERAL VEIN, PERCUTANEOUS APPROACH: ICD-10-PCS | Performed by: INTERNAL MEDICINE

## 2025-02-10 PROCEDURE — 2500000002 HC RX 250 W HCPCS SELF ADMINISTERED DRUGS (ALT 637 FOR MEDICARE OP, ALT 636 FOR OP/ED): Performed by: STUDENT IN AN ORGANIZED HEALTH CARE EDUCATION/TRAINING PROGRAM

## 2025-02-10 RX ORDER — CARVEDILOL 25 MG/1
25 TABLET ORAL 2 TIMES DAILY
Status: DISCONTINUED | OUTPATIENT
Start: 2025-02-10 | End: 2025-02-15 | Stop reason: HOSPADM

## 2025-02-10 RX ORDER — HEPARIN SODIUM 5000 [USP'U]/ML
5000 INJECTION, SOLUTION INTRAVENOUS; SUBCUTANEOUS EVERY 8 HOURS
Status: DISCONTINUED | OUTPATIENT
Start: 2025-02-10 | End: 2025-02-15 | Stop reason: HOSPADM

## 2025-02-10 RX ORDER — DEXAMETHASONE SODIUM PHOSPHATE 10 MG/ML
6 INJECTION INTRAMUSCULAR; INTRAVENOUS EVERY 24 HOURS
Status: DISCONTINUED | OUTPATIENT
Start: 2025-02-10 | End: 2025-02-12

## 2025-02-10 RX ORDER — ASPIRIN 81 MG/1
81 TABLET ORAL DAILY
Status: DISCONTINUED | OUTPATIENT
Start: 2025-02-10 | End: 2025-02-15 | Stop reason: HOSPADM

## 2025-02-10 RX ORDER — CILOSTAZOL 100 MG/1
100 TABLET ORAL 2 TIMES DAILY
Status: DISCONTINUED | OUTPATIENT
Start: 2025-02-10 | End: 2025-02-15 | Stop reason: HOSPADM

## 2025-02-10 RX ORDER — MIRTAZAPINE 15 MG/1
15 TABLET, FILM COATED ORAL NIGHTLY
Status: DISCONTINUED | OUTPATIENT
Start: 2025-02-10 | End: 2025-02-15 | Stop reason: HOSPADM

## 2025-02-10 RX ORDER — IPRATROPIUM BROMIDE AND ALBUTEROL SULFATE 2.5; .5 MG/3ML; MG/3ML
3 SOLUTION RESPIRATORY (INHALATION)
Status: DISCONTINUED | OUTPATIENT
Start: 2025-02-10 | End: 2025-02-15 | Stop reason: HOSPADM

## 2025-02-10 RX ORDER — IPRATROPIUM BROMIDE AND ALBUTEROL SULFATE 2.5; .5 MG/3ML; MG/3ML
3 SOLUTION RESPIRATORY (INHALATION) EVERY 2 HOUR PRN
Status: DISCONTINUED | OUTPATIENT
Start: 2025-02-10 | End: 2025-02-15 | Stop reason: HOSPADM

## 2025-02-10 RX ORDER — IPRATROPIUM BROMIDE AND ALBUTEROL SULFATE 2.5; .5 MG/3ML; MG/3ML
3 SOLUTION RESPIRATORY (INHALATION) 2 TIMES DAILY
Status: DISCONTINUED | OUTPATIENT
Start: 2025-02-10 | End: 2025-02-10

## 2025-02-10 RX ORDER — AMLODIPINE BESYLATE 10 MG/1
10 TABLET ORAL DAILY
Status: DISCONTINUED | OUTPATIENT
Start: 2025-02-10 | End: 2025-02-15 | Stop reason: HOSPADM

## 2025-02-10 RX ORDER — GUAIFENESIN 600 MG/1
600 TABLET, EXTENDED RELEASE ORAL 2 TIMES DAILY
Status: DISCONTINUED | OUTPATIENT
Start: 2025-02-10 | End: 2025-02-15 | Stop reason: HOSPADM

## 2025-02-10 RX ORDER — ESCITALOPRAM OXALATE 10 MG/1
10 TABLET ORAL DAILY
Status: DISCONTINUED | OUTPATIENT
Start: 2025-02-10 | End: 2025-02-15 | Stop reason: HOSPADM

## 2025-02-10 RX ORDER — OXYCODONE AND ACETAMINOPHEN 5; 325 MG/1; MG/1
1 TABLET ORAL EVERY 8 HOURS
Status: DISCONTINUED | OUTPATIENT
Start: 2025-02-10 | End: 2025-02-15 | Stop reason: HOSPADM

## 2025-02-10 RX ORDER — ATORVASTATIN CALCIUM 80 MG/1
80 TABLET, FILM COATED ORAL NIGHTLY
Status: DISCONTINUED | OUTPATIENT
Start: 2025-02-10 | End: 2025-02-15 | Stop reason: HOSPADM

## 2025-02-10 RX ORDER — PANTOPRAZOLE SODIUM 40 MG/1
40 TABLET, DELAYED RELEASE ORAL DAILY
Status: DISCONTINUED | OUTPATIENT
Start: 2025-02-10 | End: 2025-02-15 | Stop reason: HOSPADM

## 2025-02-10 RX ADMIN — OXYCODONE HYDROCHLORIDE AND ACETAMINOPHEN 1 TABLET: 5; 325 TABLET ORAL at 06:31

## 2025-02-10 RX ADMIN — ESCITALOPRAM OXALATE 10 MG: 10 TABLET ORAL at 10:04

## 2025-02-10 RX ADMIN — CILOSTAZOL 100 MG: 100 TABLET ORAL at 10:04

## 2025-02-10 RX ADMIN — OXYCODONE HYDROCHLORIDE AND ACETAMINOPHEN 1 TABLET: 5; 325 TABLET ORAL at 21:30

## 2025-02-10 RX ADMIN — PANTOPRAZOLE SODIUM 40 MG: 40 TABLET, DELAYED RELEASE ORAL at 10:04

## 2025-02-10 RX ADMIN — ASPIRIN 81 MG: 81 TABLET, COATED ORAL at 10:03

## 2025-02-10 RX ADMIN — IPRATROPIUM BROMIDE AND ALBUTEROL SULFATE 3 ML: .5; 3 SOLUTION RESPIRATORY (INHALATION) at 09:16

## 2025-02-10 RX ADMIN — HEPARIN SODIUM 5000 UNITS: 5000 INJECTION INTRAVENOUS; SUBCUTANEOUS at 21:31

## 2025-02-10 RX ADMIN — IPRATROPIUM BROMIDE AND ALBUTEROL SULFATE 3 ML: .5; 3 SOLUTION RESPIRATORY (INHALATION) at 18:49

## 2025-02-10 RX ADMIN — CARVEDILOL 25 MG: 25 TABLET, FILM COATED ORAL at 21:31

## 2025-02-10 RX ADMIN — GUAIFENESIN 600 MG: 600 TABLET, EXTENDED RELEASE ORAL at 21:31

## 2025-02-10 RX ADMIN — AMLODIPINE BESYLATE 10 MG: 10 TABLET ORAL at 10:03

## 2025-02-10 RX ADMIN — CILOSTAZOL 100 MG: 100 TABLET ORAL at 21:31

## 2025-02-10 RX ADMIN — DEXAMETHASONE SODIUM PHOSPHATE 6 MG: 10 INJECTION, SOLUTION INTRAMUSCULAR; INTRAVENOUS at 05:17

## 2025-02-10 RX ADMIN — HEPARIN SODIUM 5000 UNITS: 5000 INJECTION INTRAVENOUS; SUBCUTANEOUS at 06:31

## 2025-02-10 RX ADMIN — OXYCODONE HYDROCHLORIDE AND ACETAMINOPHEN 1 TABLET: 5; 325 TABLET ORAL at 13:02

## 2025-02-10 RX ADMIN — ATORVASTATIN CALCIUM 80 MG: 80 TABLET, FILM COATED ORAL at 21:31

## 2025-02-10 RX ADMIN — GUAIFENESIN 600 MG: 600 TABLET, EXTENDED RELEASE ORAL at 10:04

## 2025-02-10 RX ADMIN — MIRTAZAPINE 15 MG: 15 TABLET, FILM COATED ORAL at 21:31

## 2025-02-10 RX ADMIN — CARVEDILOL 25 MG: 25 TABLET, FILM COATED ORAL at 10:03

## 2025-02-10 RX ADMIN — HEPARIN SODIUM 5000 UNITS: 5000 INJECTION INTRAVENOUS; SUBCUTANEOUS at 13:02

## 2025-02-10 SDOH — SOCIAL STABILITY: SOCIAL INSECURITY: WERE YOU ABLE TO COMPLETE ALL THE BEHAVIORAL HEALTH SCREENINGS?: YES

## 2025-02-10 SDOH — SOCIAL STABILITY: SOCIAL INSECURITY: DO YOU FEEL UNSAFE GOING BACK TO THE PLACE WHERE YOU ARE LIVING?: NO

## 2025-02-10 SDOH — SOCIAL STABILITY: SOCIAL INSECURITY: HAS ANYONE EVER THREATENED TO HURT YOUR FAMILY OR YOUR PETS?: NO

## 2025-02-10 SDOH — SOCIAL STABILITY: SOCIAL INSECURITY: DO YOU FEEL ANYONE HAS EXPLOITED OR TAKEN ADVANTAGE OF YOU FINANCIALLY OR OF YOUR PERSONAL PROPERTY?: NO

## 2025-02-10 SDOH — SOCIAL STABILITY: SOCIAL INSECURITY: ARE YOU OR HAVE YOU BEEN THREATENED OR ABUSED PHYSICALLY, EMOTIONALLY, OR SEXUALLY BY ANYONE?: NO

## 2025-02-10 SDOH — SOCIAL STABILITY: SOCIAL INSECURITY: HAVE YOU HAD THOUGHTS OF HARMING ANYONE ELSE?: NO

## 2025-02-10 SDOH — SOCIAL STABILITY: SOCIAL INSECURITY: ABUSE: ADULT

## 2025-02-10 SDOH — SOCIAL STABILITY: SOCIAL INSECURITY: HAVE YOU HAD ANY THOUGHTS OF HARMING ANYONE ELSE?: NO

## 2025-02-10 SDOH — SOCIAL STABILITY: SOCIAL INSECURITY: DOES ANYONE TRY TO KEEP YOU FROM HAVING/CONTACTING OTHER FRIENDS OR DOING THINGS OUTSIDE YOUR HOME?: NO

## 2025-02-10 SDOH — SOCIAL STABILITY: SOCIAL INSECURITY: ARE THERE ANY APPARENT SIGNS OF INJURIES/BEHAVIORS THAT COULD BE RELATED TO ABUSE/NEGLECT?: NO

## 2025-02-10 ASSESSMENT — COGNITIVE AND FUNCTIONAL STATUS - GENERAL
HELP NEEDED FOR BATHING: A LOT
DAILY ACTIVITIY SCORE: 11
MOBILITY SCORE: 6
MOBILITY SCORE: 10
DRESSING REGULAR LOWER BODY CLOTHING: TOTAL
TURNING FROM BACK TO SIDE WHILE IN FLAT BAD: A LOT
MOVING TO AND FROM BED TO CHAIR: A LOT
STANDING UP FROM CHAIR USING ARMS: TOTAL
STANDING UP FROM CHAIR USING ARMS: A LOT
WALKING IN HOSPITAL ROOM: TOTAL
DRESSING REGULAR UPPER BODY CLOTHING: A LOT
MOVING TO AND FROM BED TO CHAIR: TOTAL
TOILETING: A LOT
DRESSING REGULAR UPPER BODY CLOTHING: A LOT
MOVING FROM LYING ON BACK TO SITTING ON SIDE OF FLAT BED WITH BEDRAILS: TOTAL
CLIMB 3 TO 5 STEPS WITH RAILING: TOTAL
PERSONAL GROOMING: A LITTLE
HELP NEEDED FOR BATHING: TOTAL
TOILETING: TOTAL
MOVING FROM LYING ON BACK TO SITTING ON SIDE OF FLAT BED WITH BEDRAILS: A LOT
WALKING IN HOSPITAL ROOM: TOTAL
DAILY ACTIVITIY SCORE: 14
EATING MEALS: A LITTLE
DRESSING REGULAR LOWER BODY CLOTHING: A LOT
PERSONAL GROOMING: A LOT
TURNING FROM BACK TO SIDE WHILE IN FLAT BAD: TOTAL
PATIENT BASELINE BEDBOUND: YES
CLIMB 3 TO 5 STEPS WITH RAILING: TOTAL

## 2025-02-10 ASSESSMENT — ACTIVITIES OF DAILY LIVING (ADL)
PATIENT'S MEMORY ADEQUATE TO SAFELY COMPLETE DAILY ACTIVITIES?: YES
DRESSING YOURSELF: NEEDS ASSISTANCE
GROOMING: NEEDS ASSISTANCE
JUDGMENT_ADEQUATE_SAFELY_COMPLETE_DAILY_ACTIVITIES: YES
FEEDING YOURSELF: NEEDS ASSISTANCE
BATHING: NEEDS ASSISTANCE
HEARING - RIGHT EAR: FUNCTIONAL
TOILETING: NEEDS ASSISTANCE
WALKS IN HOME: NEEDS ASSISTANCE
HEARING - LEFT EAR: FUNCTIONAL
ADEQUATE_TO_COMPLETE_ADL: YES

## 2025-02-10 ASSESSMENT — PAIN SCALES - GENERAL
PAINLEVEL_OUTOF10: 5 - MODERATE PAIN
PAINLEVEL_OUTOF10: 8
PAINLEVEL_OUTOF10: 8
PAINLEVEL_OUTOF10: 4
PAINLEVEL_OUTOF10: 4

## 2025-02-10 ASSESSMENT — LIFESTYLE VARIABLES
AUDIT-C TOTAL SCORE: 0
HOW OFTEN DO YOU HAVE 6 OR MORE DRINKS ON ONE OCCASION: NEVER
HAVE PEOPLE ANNOYED YOU BY CRITICIZING YOUR DRINKING: NO
HOW OFTEN DO YOU HAVE A DRINK CONTAINING ALCOHOL: NEVER
EVER HAD A DRINK FIRST THING IN THE MORNING TO STEADY YOUR NERVES TO GET RID OF A HANGOVER: NO
EVER FELT BAD OR GUILTY ABOUT YOUR DRINKING: NO
AUDIT-C TOTAL SCORE: 0
TOTAL SCORE: 0
HAVE YOU EVER FELT YOU SHOULD CUT DOWN ON YOUR DRINKING: NO
HOW MANY STANDARD DRINKS CONTAINING ALCOHOL DO YOU HAVE ON A TYPICAL DAY: PATIENT DOES NOT DRINK
SKIP TO QUESTIONS 9-10: 1

## 2025-02-10 ASSESSMENT — COLUMBIA-SUICIDE SEVERITY RATING SCALE - C-SSRS
6. HAVE YOU EVER DONE ANYTHING, STARTED TO DO ANYTHING, OR PREPARED TO DO ANYTHING TO END YOUR LIFE?: NO
2. HAVE YOU ACTUALLY HAD ANY THOUGHTS OF KILLING YOURSELF?: NO
1. IN THE PAST MONTH, HAVE YOU WISHED YOU WERE DEAD OR WISHED YOU COULD GO TO SLEEP AND NOT WAKE UP?: NO

## 2025-02-10 ASSESSMENT — PAIN - FUNCTIONAL ASSESSMENT
PAIN_FUNCTIONAL_ASSESSMENT: 0-10

## 2025-02-10 ASSESSMENT — PAIN DESCRIPTION - DESCRIPTORS: DESCRIPTORS: ACHING

## 2025-02-10 NOTE — PROGRESS NOTES
02/10/25 1113   Discharge Planning   Living Arrangements Other (Comment)   Support Systems Children   Type of Residence Nursing home/residential care   Expected Discharge Disposition SNF   Does the patient need discharge transport arranged? Yes   RoundTrip coordination needed? Yes     I met with patient and his daughter at the bedside.  Patient presented from [facility] where he had resided for the last year.  Patient and daughter/MYNOR Mercer voiced extreme dissatisfaction with the care he was receiving at the AL.  Prior to AL, patient had a stroke and was in and out of the hospital and various SNFs.  After his stroke, patient has L hemiplegia and has been virtually bedbound and requires extensive assistance with ADLs.  Patient and daughter are agreeable to skilled nursing facility as a bridge to finding another assisted living facility.  This TCC enlisted the services of [facility] to assist patient and daughter with securing a safe discharge plan.  If patient is skillable, daughter verbalized preference for [facility] to potentially transition to long term care there; referral sent.  Care Coordination team following for assistance with discharge planning.  Terence KENNEDY TCC

## 2025-02-10 NOTE — CARE PLAN
The patient's goals for the shift include      The clinical goals for the shift include maintain safety and patent airway    Problem: Pain - Adult  Goal: Verbalizes/displays adequate comfort level or baseline comfort level  Outcome: Progressing  Flowsheets (Taken 2/10/2025 0808)  Verbalizes/displays adequate comfort level or baseline comfort level:   Assess pain using appropriate pain scale   Encourage patient to monitor pain and request assistance   Administer analgesics based on type and severity of pain and evaluate response     Problem: Safety - Adult  Goal: Free from fall injury  Outcome: Progressing  Flowsheets (Taken 2/10/2025 0808)  Free from fall injury: Instruct family/caregiver on patient safety

## 2025-02-10 NOTE — H&P
History Of Present Illness  Greg Bustos is a 64 y.o. male presenting to the emergency department via EMS from [facility] for evaluation of hypoxia.  Per EMS patient had an SpO2 of 88% on 4 L of oxygen at the facility and was just recently diagnosed with COVID-19.  Patient had also received an oxycodone for leg pain prior to arrival to hospital.  Patient states he was diagnosed with COVID and has had 2 episodes of diarrhea yesterday.  Patient states he has also been having intermittent midsternal nonradiating chest discomfort and mild shortness of breath that has been worse with exertion.  Patient denies any recent travel or known sick contacts.  Patient denies cough, abdominal pain, nausea, or vomiting.  Patient denies fever or chills.    In ED, COVID-19 positive, glucose 105, sodium 135, potassium 5.4 with a repeat of 3.5.  Hemoglobin 13.1, hematocrit 40.2.  Patient requiring supplemental oxygen and SpO2's remain in the high 80s to low 90s.  Patient ordered dexamethasone 6 mg IV push x 1.  Last echocardiogram on file from 2023 shows an ejection fraction of 50 to 55%.  Chest x-ray showing concern for developing interstitial edema per radiology review.  BNP ordered and pending.  Blood pressure 120/70, heart rate 73, respirations 18, temperature 37.1 °C.  EKG completed showing sinus rhythm at a rate of 70 without ST elevation or depression per ED physician review.  Trending troponin.  Patient has a DNR CC in the computer from his last admission in April 2024.  In discussion with patient he indicates that he would rather be a DNR CCA however a note from his visit with palliative is suggesting a full code.  Patient appears to lack the understanding needed to make this decision.  Patient's POA is his daughter who will need to be contacted to clear up his CODE STATUS.  Per me at this time patient states DNR CCA.  Order placed.  Patient admitted to telemetry observation under the care of Dr. Rose who  will continue to follow.  I was asked to do H&P and place initial admission orders.     Past Medical History  CVA, chronic diarrhea, COPD, dyslipidemia, hypertension, GERD, PVD, restless leg syndrome, CAD, tremor, right lower extremity weakness  Surgical History  Cholecystectomy, cataract surgery, cardiac catheterization, ankle surgery       Social History  Former smoker, no drug use, no alcohol use, residing at skilled nursing facility  Family History  Reviewed and noncontributory     Allergies  Hydrocodone-acetaminophen, Divalproex sodium, Gabapentin, and Tramadol    Review of Systems  A 10 point review of systems was completed and negative except what is listed in HPI  Physical Exam  Constitutional:       Appearance: Normal appearance.   HENT:      Mouth/Throat:      Mouth: Mucous membranes are dry.      Pharynx: Oropharynx is clear.   Eyes:      Pupils: Pupils are equal, round, and reactive to light.   Cardiovascular:      Rate and Rhythm: Normal rate and regular rhythm.   Pulmonary:      Breath sounds: Decreased breath sounds present.   Abdominal:      General: Bowel sounds are normal.      Palpations: Abdomen is soft.   Musculoskeletal:         General: Normal range of motion.      Cervical back: Normal range of motion.   Skin:     General: Skin is warm.      Capillary Refill: Capillary refill takes less than 2 seconds.   Neurological:      General: No focal deficit present.      Mental Status: He is alert.     XR chest 1 view    Result Date: 2/10/2025  Interpreted By:  Henry Fregoso, STUDY: XR CHEST 1 VIEW;  2/9/2025 11:43 pm   INDICATION: Signs/Symptoms:cp/ sob.   COMPARISON: Chest x-ray 04/07/2024   ACCESSION NUMBER(S): [id number]   ORDERING CLINICIAN: ABENA LEUNG   FINDINGS:     CARDIOMEDIASTINAL SILHOUETTE: Cardiomediastinal silhouette is stable in size and configuration. Mild unfolding of the descending thoracic aorta.   LUNGS: No consolidation, pleural effusion or pneumothorax. Mild diffuse  interstitial prominence   ABDOMEN: No remarkable upper abdominal findings.   BONES: Multilevel degenerative changes of the spine       Mild diffuse interstitial prominence could be chronic or relate to component developing interstitial edema. Correlate clinically.   MACRO: None   Signed by: Henry Fregoso 2/10/2025 12:38 AM Dictation workstation:   [id number]   Results for orders placed or performed during the hospital encounter of 02/09/25 (from the past 24 hours)   CBC and Auto Differential   Result Value Ref Range    WBC 7.2 4.4 - 11.3 x10*3/uL    nRBC 0.0 0.0 - 0.0 /100 WBCs    RBC 4.98 4.50 - 5.90 x10*6/uL    Hemoglobin 13.1 (L) 13.5 - 17.5 g/dL    Hematocrit 40.2 (L) 41.0 - 52.0 %    MCV 81 80 - 100 fL    MCH 26.3 26.0 - 34.0 pg    MCHC 32.6 32.0 - 36.0 g/dL    RDW 16.0 (H) 11.5 - 14.5 %    Platelets 304 150 - 450 x10*3/uL    Immature Granulocytes %, Automated 1.3 (H) 0.0 - 0.9 %    Immature Granulocytes Absolute, Automated 0.09 0.00 - 0.70 x10*3/uL   Comprehensive Metabolic Panel   Result Value Ref Range    Glucose 105 (H) 74 - 99 mg/dL    Sodium 135 (L) 136 - 145 mmol/L    Potassium 5.4 (H) 3.5 - 5.3 mmol/L    Chloride 104 98 - 107 mmol/L    Bicarbonate 21 21 - 32 mmol/L    Anion Gap 15 10 - 20 mmol/L    Urea Nitrogen 14 6 - 23 mg/dL    Creatinine 1.14 0.50 - 1.30 mg/dL    eGFR 71 >60 mL/min/1.73m*2    Calcium 9.0 8.6 - 10.3 mg/dL    Albumin 3.9 3.4 - 5.0 g/dL    Alkaline Phosphatase 80 33 - 136 U/L    Total Protein 8.2 6.4 - 8.2 g/dL    AST 40 (H) 9 - 39 U/L    Bilirubin, Total 0.5 0.0 - 1.2 mg/dL    ALT 26 10 - 52 U/L   Magnesium   Result Value Ref Range    Magnesium 2.18 1.60 - 2.40 mg/dL   Blood Gas Venous Full Panel   Result Value Ref Range    POCT pH, Venous 7.39 7.33 - 7.43 pH    POCT pCO2, Venous 35 (L) 41 - 51 mm Hg    POCT pO2, Venous 44 35 - 45 mm Hg    POCT SO2, Venous 74 45 - 75 %    POCT Oxy Hemoglobin, Venous 72.2 45.0 - 75.0 %    POCT Hematocrit Calculated, Venous 39.0 (L) 41.0 - 52.0 %     POCT Sodium, Venous 135 (L) 136 - 145 mmol/L    POCT Potassium, Venous 4.2 3.5 - 5.3 mmol/L    POCT Chloride, Venous 105 98 - 107 mmol/L    POCT Ionized Calicum, Venous 1.21 1.10 - 1.33 mmol/L    POCT Glucose, Venous 119 (H) 74 - 99 mg/dL    POCT Lactate, Venous 1.5 0.4 - 2.0 mmol/L    POCT Base Excess, Venous -3.2 (L) -2.0 - 3.0 mmol/L    POCT HCO3 Calculated, Venous 21.2 (L) 22.0 - 26.0 mmol/L    POCT Hemoglobin, Venous 13.0 (L) 13.5 - 17.5 g/dL    POCT Anion Gap, Venous 13.0 10.0 - 25.0 mmol/L    Patient Temperature 37.0 degrees Celsius    FiO2 36 %   Troponin I, High Sensitivity, Initial   Result Value Ref Range    Troponin I, High Sensitivity 5 0 - 20 ng/L   Manual Differential   Result Value Ref Range    Neutrophils %, Manual 53.0 40.0 - 80.0 %    Bands %, Manual 6.0 0.0 - 5.0 %    Lymphocytes %, Manual 31.0 13.0 - 44.0 %    Monocytes %, Manual 8.0 2.0 - 10.0 %    Eosinophils %, Manual 2.0 0.0 - 6.0 %    Basophils %, Manual 0.0 0.0 - 2.0 %    Seg Neutrophils Absolute, Manual 3.82 1.20 - 7.00 x10*3/uL    Bands Absolute, Manual 0.43 0.00 - 0.70 x10*3/uL    Lymphocytes Absolute, Manual 2.23 1.20 - 4.80 x10*3/uL    Monocytes Absolute, Manual 0.58 0.10 - 1.00 x10*3/uL    Eosinophils Absolute, Manual 0.14 0.00 - 0.70 x10*3/uL    Basophils Absolute, Manual 0.00 0.00 - 0.10 x10*3/uL    Total Cells Counted 100     Neutrophils Absolute, Manual 4.25 1.20 - 7.70 x10*3/uL    RBC Morphology See Below     Bhaskar Cells Few    Influenza A, and B PCR   Result Value Ref Range    Flu A Result Not Detected Not Detected    Flu B Result Not Detected Not Detected   RSV PCR   Result Value Ref Range    RSV PCR Not Detected Not Detected   Sars-CoV-2 PCR   Result Value Ref Range    Coronavirus 2019, PCR Detected (A) Not Detected   Troponin, High Sensitivity, 1 Hour   Result Value Ref Range    Troponin I, High Sensitivity 6 0 - 20 ng/L   Potassium   Result Value Ref Range    Potassium 3.5 3.5 - 5.3 mmol/L          Last Recorded  "Vitals  Blood pressure 124/69, pulse 71, temperature 36.7 °C (98.1 °F), temperature source Temporal, resp. rate (!) 22, height 1.651 m (5' 5\"), weight 78.5 kg (173 lb), SpO2 (!) 90%.    Relevant Results  .  Assessment/Plan   Greg is a 64-year-old male patient presenting to ED from [facility] nursing facility for evaluation of chest pain and shortness of breath.  Patient states that he also had 2 episodes of diarrhea yesterday.  Patient states he was diagnosed with COVID at the long term facility.  Glucose 105, sodium 135, potassium 5.4 with a repeat of 3.5.  Troponin negative x 2.  Patient given dexamethasone 6 mg IV push x 1.  Patient on supplemental oxygen.  Chest x-ray showing a developing interstitial edema, BNP ordered and pending.  Patient admitted to observation for further medical management.    Chest pain/SOB/COVID-19  Admit to telemetry observation per Dr. Rose  See imaging results above  Need to clarify patient's DNR status (with his POA and the patient)  Dexamethasone 6 mg IV push every 24 hours  Supplemental oxygen  BNP ordered and pending  Repeat labs  Trend troponin  Mucinex twice daily  Telemetry monitoring    Hyperkalemia/hyponatremia  K+ 5.4  Repeat K+ 3.5 without intervention  Na+ 135  Repeat labs pending     CVA/COPD/CAD/dyslipidemia/GERD/hypertension/cardiac catheterization  #Chronic conditions  Continue home medications  Cardiac diet  PT/OT    DVT Ppx  SCDs  Heparin subcutaneous  Out of bed with assistance      I spent 45 minutes in the professional and overall care of this patient.  Desiree Maurer, APRN-CNP    "

## 2025-02-10 NOTE — CONSULTS
"Nutrition Initial Assessment:   Nutrition Assessment    Reason for Assessment: Admission nursing screening    Patient is a 64 y.o. male presenting with chest pain.       Nutrition History:  Energy Intake:  (unable to determine at this time)  Food and Nutrient History: Called pt's room phone to complete nutrition interview due to COVID precautions but pt did not answer. All hx obtained via chart review. No documented meal intakes. Denied eating poorly due to decreased appetite on admission screening. + chronic diarrhea per H&P.  Vitamin/Herbal Supplement Use: none listed in home med list       Anthropometrics:  Height: 165.1 cm (5' 5\")   Weight: 78.5 kg (173 lb)   BMI (Calculated): 28.79  IBW/kg (Dietitian Calculated): 61.8 kg          Weight History:   Wt Readings from Last 10 Encounters:   02/09/25 78.5 kg (173 lb)   04/07/24 72.6 kg (160 lb)   03/27/24 72.6 kg (160 lb)   01/30/23 70.3 kg (155 lb)   10/31/22 70.3 kg (155 lb)   07/13/22 72.6 kg (160 lb)   04/13/22 72.6 kg (160 lb)   01/12/22 72.6 kg (160 lb)   11/01/21 69.4 kg (153 lb)   10/08/21 72.1 kg (159 lb)      Weight Change %:  Weight History / % Weight Change: Based on available wt records, pt with ~6 kg wt gain x 10 months, although weight this admission is documented as stated and may not be accurate.  Significant Weight Loss: No    Nutrition Focused Physical Exam Findings:    Subcutaneous Fat Loss:   Defer Subcutaneous Fat Loss Assessment: Defer all  Defer All Reason: COVID precautions  Muscle Wasting:  Defer Muscle Wasting Assessment: Defer all  Defer All Reason: COVID precautions  Edema:  Edema Location: non-pitting BLE per nursing assessment  Physical Findings:  Skin: Negative  Digestive System Findings: Diarrhea    Nutrition Significant Labs:    Reviewed    Nutrition Specific Medications:  Reviewed     I/O:   Last BM Date: 02/10/25; Stool Appearance: Formed (02/10/25 0303)    Dietary Orders (From admission, onward)       Start     Ordered    02/10/25 " 1011  Oral nutritional supplements  Until discontinued        Question Answer Comment   Deliver with Breakfast    Deliver with Dinner    Select supplement: Ensure High Protein        02/10/25 1010    02/10/25 0805  May Participate in Room Service  ( ROOM SERVICE MAY PARTICIPATE)  Once        Question:  .  Answer:  Yes    02/10/25 0804    02/10/25 0519  Adult diet Cardiac; 70 gm fat; 2 - 3 grams Sodium  Diet effective now        Question Answer Comment   Diet type Cardiac    Fat restriction: 70 gm fat    Sodium restriction: 2 - 3 grams Sodium        02/10/25 0518                     Estimated Needs:   Total Energy Estimated Needs in 24 hours (kCal): 1730 kCal  Method for Estimating Needs: 28 kcal/kg IBW  Total Protein Estimated Needs in 24 Hours (g): 62 g  Method for Estimating 24 Hour Protein Needs: 1 g/kg IBW  Total Fluid Estimated Needs in 24 Hours (mL): 1730 mL  Method for Estimating 24 Hour Fluid Needs: 1 ml/kcal or per MD        Nutrition Diagnosis   Malnutrition Diagnosis  Patient has Malnutrition Diagnosis:  (unable to determine at this time)    Nutrition Diagnosis  Patient has Nutrition Diagnosis: Yes  Diagnosis Status (1): New  Nutrition Diagnosis 1: Increased energy expenditure  Related to (1): acute illness; chronic ilness  As Evidenced by (1): COVID infection; COPD       Nutrition Interventions/Recommendations   Nutrition prescription for oral nutrition    Nutrition Recommendations:  Individualized Nutrition Prescription Provided for : Cardiac diet with ONS    Nutrition Interventions/Goals:   Interventions: Meals and snacks, Medical food supplement  Meals and Snacks: Fat-modified diet, Mineral-modified diet  Goal: Consumes 3 meals per day  Medical Food Supplement: Commercial beverage medical food supplement therapy  Goal: Ensure High Protein BID (160 kcal and 16 g protein per serving)      Education Documentation  No documentation found.     N/A - pt unavailable       Nutrition Monitoring and  Evaluation   Food/Nutrient Related History Monitoring  Monitoring and Evaluation Plan: Intake / amount of food, Estimated Energy Intake  Estimated Energy Intake: Energy intake greater or equal to 75% of estimated energy needs  Intake / Amount of food: Consumes at least 75% or more of meals/snacks/supplements, Meets > 75% estimated energy needs    Anthropometric Measurements  Monitoring and Evaluation Plan: Body weight  Body Weight: Body weight - Maintain stable weight         Physical Exam Findings  Monitoring and Evaluation Plan: Digestive System  Digestive System Finding: Diarrhea  Criteria: Regular BMs    Goal Status: New goal(s) identified    Time Spent (min): 30 minutes

## 2025-02-10 NOTE — ED PROVIDER NOTES
HPI   No chief complaint on file.      Patient is nontoxic-appearing 64-year-old male with a past medical history of arteriosclerosis of coronary artery, CVA, chronic diarrhea, COPD, dyslipidemia, hypertension, esophageal reflux, peripheral vascular disease, restless leg syndrome, status post coronary artery stent placement, tremor, weakness of the right lower extremity, presents to the emergency today for complaint of COVID-19, pulse ox and diarrhea.  Patient states that he was diagnosed with COVID and has had 2 episodes of diarrhea today.  Patient states he has been having intermittent midsternal nonradiating chest discomfort and mild shortness of breath with exertion.  Patient denies any recent travel, contact with known ill individuals, cough or congestion, abdominal pain, nausea or vomiting.  Patient denies any urinary complaints, fever, shaking, or chills.              Patient History   Past Medical History:   Diagnosis Date    Abnormal findings on diagnostic imaging of other specified body structures     Abnormal CT of the chest    Abnormal findings on diagnostic imaging of other specified body structures     Abnormal CT of the chest    Personal history of other medical treatment     History of echocardiogram    Personal history of other medical treatment     History of nuclear stress test     Past Surgical History:   Procedure Laterality Date    CHOLECYSTECTOMY  07/14/2016    Cholecystectomy    MR HEAD ANGIO WO IV CONTRAST  3/2/2023    MR HEAD ANGIO WO IV CONTRAST PAR MRI    MR NECK ANGIO WO IV CONTRAST  3/2/2023    MR NECK ANGIO WO IV CONTRAST PAR MRI    OTHER SURGICAL HISTORY  07/14/2016    Cath Stent Placement Number Of Stents Placed:    OTHER SURGICAL HISTORY  10/27/2020    Finger surgical procedure     No family history on file.  Social History     Tobacco Use    Smoking status: Never    Smokeless tobacco: Never   Vaping Use    Vaping status: Never Used   Substance Use Topics    Alcohol use: Not on file     Drug use: Never       Physical Exam   ED Triage Vitals   Temp Pulse Resp BP   -- -- -- --      SpO2 Temp src Heart Rate Source Patient Position   -- -- -- --      BP Location FiO2 (%)     -- --       Physical Exam  Vitals and nursing note reviewed. Exam conducted with a chaperone present.   Constitutional:       General: He is not in acute distress.     Appearance: Normal appearance. He is not ill-appearing, toxic-appearing or diaphoretic.      Interventions: He is not intubated.  HENT:      Head: Normocephalic.      Nose: Nose normal. No congestion or rhinorrhea.      Mouth/Throat:      Mouth: Mucous membranes are moist.      Pharynx: No oropharyngeal exudate or posterior oropharyngeal erythema.   Eyes:      General:         Right eye: No discharge.         Left eye: No discharge.      Extraocular Movements: Extraocular movements intact.      Pupils: Pupils are equal, round, and reactive to light.   Neck:      Vascular: No carotid bruit.   Cardiovascular:      Rate and Rhythm: Normal rate and regular rhythm.      Pulses: Normal pulses. No decreased pulses.      Heart sounds: Normal heart sounds. Heart sounds not distant. No murmur heard.     No friction rub. No gallop.   Pulmonary:      Effort: Pulmonary effort is normal. No tachypnea, bradypnea, accessory muscle usage, prolonged expiration, respiratory distress or retractions. He is not intubated.      Breath sounds: Normal breath sounds. No stridor, decreased air movement or transmitted upper airway sounds. No decreased breath sounds, wheezing, rhonchi or rales.   Chest:      Chest wall: No tenderness.   Abdominal:      General: Abdomen is flat. Bowel sounds are normal. There is no distension.      Palpations: Abdomen is soft. There is no mass.      Tenderness: There is no abdominal tenderness. There is no right CVA tenderness, left CVA tenderness, guarding or rebound.      Hernia: No hernia is present.   Musculoskeletal:         General: No swelling,  tenderness, deformity or signs of injury. Normal range of motion.      Cervical back: Normal range of motion and neck supple. No rigidity or tenderness.      Right lower leg: No edema.      Left lower leg: No edema.   Lymphadenopathy:      Cervical: No cervical adenopathy.   Skin:     General: Skin is warm and dry.      Capillary Refill: Capillary refill takes less than 2 seconds.      Coloration: Skin is not jaundiced or pale.      Findings: No bruising, erythema, lesion or rash.   Neurological:      General: No focal deficit present.      Mental Status: He is alert and oriented to person, place, and time.           ED Course & MDM   ED Course as of 02/10/25 0253   Mon Feb 10, 2025   0013 EKG interpreted by myself reveals sinus rhythm at rate of 70 bpm with no ST elevation however there is T wave inversion in lead V1, V3, V4 that is similar in comparison to previous EKG, MD interval 176, QRS 92 and QTc of 598. [EC]   0043 Chest x-ray reveals  IMPRESSION:  Mild diffuse interstitial prominence could be chronic or relate to  component developing interstitial edema. Correlate clinically.   [EC]   0049 Coronavirus 2019, PCR(!): Detected [EC]      ED Course User Index  [EC] Philip Mayo, APRN-CNP         Diagnoses as of 02/10/25 0253   Chest pain, unspecified type   SOB (shortness of breath)   Diarrhea, unspecified type   Hypoxia   COVID-19                 No data recorded                                 Medical Decision Making  Given patient's complaint presentation a thorough exam was performed.  Patient remains hemodynamically stable during emergency evaluation, is afebrile, no adventitious lung sounds auscultated, speaking complete sentences no respiratory distress, cardiac sounds auscultated are regular, no reproduce abdominal tenderness upon palpation, bowel sounds present in all 4 quadrants, I have a low suspicion for ACS, pulmonary embolism, aortic aneurysm, acute abdomen.  Lab work was ordered including EKG,  chest x-ray. Lab work reveals several irregularities doubt any critical lab values, patient did test positive for COVID-19, patient's pulse ox on 2 L upon arrival to emergency room was 89%, has improved to 95% on 4 L nasal cannula, troponin initially was 5, repeat of 6.  Initial potassium was hemolyzed at 5.4, repeat nonhemolyzed is 3.5.  Chest x-ray as interpreted by radiologist reveals mild diffuse interstitial prominence could be chronic or related to component developing interstitial edema.  Given patient was recently diagnosed with COVID-19 I do suspect this is the cause of patient's symptoms.  I consulted Dr. Rose in regards to admission for hypoxia, shortness of breath, generalized weakness, COVID-19.  Dr. Rose has agreed admit patient to his service and will manage inpatient care.    ANTONIO Alexander     Portions of this note were generated using digital voice recognition software, and may contain grammatical errors      Procedure  Procedures       ANTONIO Alexander  02/10/25 0253

## 2025-02-10 NOTE — PROGRESS NOTES
Physical Therapy    Physical Therapy Evaluation    Patient Name: Greg Bustos  MRN: [id number]  Today's Date: 2/10/2025   Time Calculation  Start Time: 1055  Stop Time: 1115  Time Calculation (min): 20 min  614/614-A    Assessment/Plan   PT Assessment  PT Assessment Results: Decreased strength, Impaired balance, Decreased mobility  Rehab Prognosis: Fair  Evaluation/Treatment Tolerance: Patient tolerated treatment well  Medical Staff Made Aware: Yes  Strengths: Attitude of self  End of Session Communication: Bedside nurse  Assessment Comment: pt admitted for chest pain and COVID. pt tolerated session. pt required assist during functional mobility to maintain safety. pt presented with decreased functional mobility, strength, and balance. pt would benefit from trialing mod int therapy in order to return to PLOF.  End of Session Patient Position: Bed, 3 rail up, Alarm off, not on at start of session (dtr present)  IP OR SWING BED PT PLAN  Inpatient or Swing Bed: Inpatient  PT Plan  Treatment/Interventions: Bed mobility, Transfer training, Gait training, Balance training, Strengthening, Therapeutic exercise, Therapeutic activity  PT Plan: Ongoing PT  PT Frequency: 3 times per week  PT Discharge Recommendations: Moderate intensity level of continued care  PT Recommended Transfer Status: Assist x2  PT - OK to Discharge: Yes (once medically cleared)    Subjective     Current Problem:  1. Chest pain, unspecified type        2. SOB (shortness of breath)        3. Diarrhea, unspecified type        4. Hypoxia        5. COVID-19          Patient Active Problem List   Diagnosis    Short of breath on exertion    Arteriosclerosis of coronary artery    Abnormal computerized axial tomography of chest    Cerebrovascular accident (CVA) (Multi)    Chronic diarrhea    Chronic obstructive pulmonary disease (COPD) (Multi)    Dyslipidemia    Confusional state    Essential hypertension    GERD (gastroesophageal reflux disease)    Lumbago     Herniation of lumbar intervertebral disc without myelopathy    Peripheral vascular disease (CMS-HCC)    Restless leg syndrome    S/P coronary artery stent placement    Tremor    Weakness of right lower extremity    Chest pain, unspecified type       General Visit Information:  General  Reason for Referral: PT eval  Referred By: Rose  Past Medical History Relevant to Rehab: COPD, CVA, HTN, HLD, arterisclerosis of coronary, chronic driarrhea, esophageal reflux, PVD, restless leg syndrome, tremor, LLE weakness  Co-Treatment: OT  Co-Treatment Reason: to maximize pt safety and mobility  Prior to Session Communication: Bedside nurse  Patient Position Received: Bed, 3 rail up (dtr present)  General Comment: pt agreeable to therapy    Home Living:  Home Living  Home Living Comments: pt resides at AL facility. needing assist with ADLs/iADLs. pt is 2 person transfer from bed to w/c. assist with w/c propulsion. denies falls. pt was last seen by therapy 6  months ago.    Prior Level of Function:  Prior Function Per Pt/Caregiver Report  Level of [place]: Independent with ADLs and functional transfers, Independent with homemaking with ambulation    Precautions:  Precautions  Precautions Comment: droplet plus (COVID+), OOB with assist, DNR, strict I and O, NC, purewick, L sided weakness      Objective     Pain:  Pain Assessment  Pain Assessment: 0-10  0-10 (Numeric) Pain Score: 8 (generalized pain)    Cognition:  Cognition  Overall Cognitive Status: Within Functional Limits  Orientation Level: Oriented X4    General Assessments:         Sensation  Light Touch:  (n/t L sided of body)  Strength  Strength Comments: BLE strength 4-/5 grossly. BLE ROM WFL for pts age             Functional Assessments:     Bed Mobility  Bed Mobility: Yes  Bed Mobility 1  Bed Mobility 1: Supine to sitting, Sitting to supine  Bed Mobility Comments 1: dep Ax2; during static sitting -- leaning R and requiring maxAx1 to maintain upright. v/c to  correct posture. unable to stand this date d/t decreased activity tolerance.  Transfers  Transfer: No  Ambulation/Gait Training  Ambulation/Gait Training Performed: No          Outcome Measures:     [facility] [address] [facility] Basic Mobility  Turning from your back to your side while in a flat bed without using bedrails: Total  Moving from lying on your back to sitting on the side of a flat bed without using bedrails: Total  Moving to and from bed to chair (including a wheelchair): Total  Standing up from a chair using your arms (e.g. wheelchair or bedside chair): Total  To walk in hospital room: Total  Climbing 3-5 steps with railing: Total  Basic Mobility - Total Score: 6                                                             Goals:  Encounter Problems       Encounter Problems (Active)       PT Problem       STG - Pt will perform passive /active assisted B LE ther ex program of 2 sets of 10  (Progressing)       Start:  02/10/25    Expected End:  02/24/25            STG - Pt will transition supine <> sitting with modAx2  (Progressing)       Start:  02/10/25    Expected End:  02/24/25            STG - Pt will transfer STS with maxAx2 (Progressing)       Start:  02/10/25    Expected End:  02/24/25            STG - Pt will SPT bed <> chair with modAx2 (Progressing)       Start:  02/10/25    Expected End:  02/24/25               Pain - Adult            Education Documentation  Mobility Training, taught by Mayra Bennett, PT at 2/10/2025  1:49 PM.  Learner: Family, Patient  Readiness: Acceptance  Method: Explanation  Response: Verbalizes Understanding    Education Comments  No comments found.

## 2025-02-10 NOTE — PROGRESS NOTES
Occupational Therapy    Evaluation    Patient Name: Greg Bustos  MRN: [id number]  Department: [facility]  Room: [address] [place] [address]  Today's Date: 2/10/2025  Time Calculation  Start Time: 1055  Stop Time: 1115  Time Calculation (min): 20 min    Assessment  IP OT Assessment  OT Assessment: Pt demonstrates a decline adl/functional mob. Recommend mod intensity OT tx intervention. Good prognosis for goal attainment  End of Session Communication: Bedside nurse  End of Session Patient Position: Bed, 3 rail up, Alarm off, not on at start of session (dtr present)  Plan:  Treatment Interventions: ADL retraining, Functional transfer training  OT Frequency: 3 times per week  OT Discharge Recommendations: Moderate intensity level of continued care  OT - OK to Discharge:  (okay to discharge to next level of care pending medical team clearance)    Subjective   Current Problem:  1. Chest pain, unspecified type        2. SOB (shortness of breath)        3. Diarrhea, unspecified type        4. Hypoxia        5. COVID-19          General:  General  Reason for Referral: OT eval/impaired functional daily living skills  Referred By: Rose  Past Medical History Relevant to Rehab: COPD, CVA, HTN, HLD, arterisclerosis of coronary, chronic driarrhea, esophageal reflux, PVD, restless leg syndrome, tremor, LLE weakness  Co-Treatment: PT  Co-Treatment Reason: to maximize pt safety and mobility  Prior to Session Communication: Bedside nurse  Patient Position Received: Bed, 3 rail up (dtr present)  General Comment: pt agreeable to therapy  Precautions:  Precautions Comment: droplet plus (COVID+), OOB with assist, DNR, strict I and O, NC, purewick, L sided weakness     Date/Time Vitals Session Patient Position Pulse Resp SpO2 BP MAP (mmHg)    02/10/25 16:35:37 --  --  78  --  98 %  143/80  106                Pain:  Pain Assessment  Pain Assessment: 0-10  0-10 (Numeric) Pain Score: 8 (generalized pain)    Objective   Cognition:  Overall Cognitive Status:  Within Functional Limits  Orientation Level: Oriented X4           Home Living:  Home Living Comments: Pt resides at AL facility. needing assist with ADLs/iADLs. Pt is 2 person transfer from bed to w/c. assist with w/c propulsion. denies falls. Pt was last seen by OT/PT therapy 6 months ago. Pt's dtr reports in pt's ability since OT/PT therapuetic  interventions ended.   Prior Function:  Level of [place]: Independent with ADLs and functional transfers, Independent with homemaking with ambulation  IADL History:  Homemaking Responsibilities:  (staff assist)  ADL:   PTA - assisted  Activity Tolerance:   PTA -assisted  Bed Mobility/Transfers: Bed Mobility  Bed Mobility: Yes  Bed Mobility 1  Bed Mobility 1: Supine to sitting, Sitting to supine  Bed Mobility Comments 1: dep Ax2; during static sitting -- leaning R and requiring maxAx1 to maintain upright. v/c to correct posture. Pt leans heavilty to the  right .Unable to stand this date d/t decreased activity tolerance.    Transfers  Transfer: No      Functional Mobility:  Functional Mobility  Functional Mobility Performed:  (unable to complete this date)  Modalities:     IADL's:   Homemaking Responsibilities:  (staff assist)  Vision: Vision - Basic Assessment  Current Vision: No visual deficits  Sensation:  Light Touch:  (c/o of left sided body numbness)  Strength:  Strength Comments: strength below elbow wfl  Perception:     Coordination:      Hand Function:  Hand Function  Gross Grasp: Functional  Extremities: RUE   RUE : Within Functional Limits and LUE   LUE:  (shoulder flex 25 deg/ elbow flex wfl,-50 elbow ext deficit., fill finger flexion- difficulty with voliotional release , 3/4 finger ext)    Outcome Measures: [facility] Daily Activity  Putting on and taking off regular lower body clothing: Total  Bathing (including washing, rinsing, drying): Total  Putting on and taking off regular upper body clothing: A lot  Toileting, which includes using toilet, bedpan or  urinal: Total  Taking care of personal grooming such as brushing teeth: A lot  Eating Meals: None  Daily Activity - Total Score: 11      Education Documentation  Body Mechanics, taught by Opal Pleitez OT at 2/10/2025  3:01 PM.  Learner: Patient  Readiness: Acceptance  Method: Demonstration  Response: Demonstrated Understanding, Needs Reinforcement    Precautions, taught by Opal Pleitez OT at 2/10/2025  3:01 PM.  Learner: Patient  Readiness: Acceptance  Method: Demonstration  Response: Demonstrated Understanding, Needs Reinforcement    ADL Training, taught by Opal Pleitez OT at 2/10/2025  3:01 PM.  Learner: Patient  Readiness: Acceptance  Method: Demonstration  Response: Demonstrated Understanding, Needs Reinforcement    Mobility Training, taught by Opal Pleitez OT at 2/10/2025  3:01 PM.  Learner: Patient  Readiness: Acceptance  Method: Demonstration  Response: Demonstrated Understanding, Needs Reinforcement    Education Comments  No comments found.      Goals:   Encounter Problems       Encounter Problems (Active)       impaired functional daily living skills        Pt will increase functional use of bilateral  UE as able to assist in adl/functional mobility transfers (Progressing)       Start:  02/10/25    Expected End:  02/24/25               impaired functional living skills       Pt will increase Grooming to min a Upper Body Bathing to min a  and Lower Body Bathing  to mod a x 2  increase Upper Body Dressing  to min a  and LE Dressing to mod a x 2 with/ without adaptive equipment as needed (Progressing)       Start:  02/10/25    Expected End:  02/24/25            Pt will increase Functional Transfers Bed Mobility to min a x 2  Sit to Stand  to mod a x 2  Functional Mobility  with /without a device to mod a x  2 with device bed <>chair/commode to increase indep/safety in patients discharge environment (Progressing)       Start:  02/10/25    Expected End:  02/24/25            Pt will  increase  energy conservation /work simplification/diaphragmatic breathing performance to sba assistance to increase independence and safety  within  the patient's discharge environment  (Progressing)       Start:  02/10/25    Expected End:  02/24/25

## 2025-02-11 PROCEDURE — 1200000002 HC GENERAL ROOM WITH TELEMETRY DAILY

## 2025-02-11 PROCEDURE — 94640 AIRWAY INHALATION TREATMENT: CPT

## 2025-02-11 PROCEDURE — 2500000004 HC RX 250 GENERAL PHARMACY W/ HCPCS (ALT 636 FOR OP/ED): Performed by: NURSE PRACTITIONER

## 2025-02-11 PROCEDURE — 2500000002 HC RX 250 W HCPCS SELF ADMINISTERED DRUGS (ALT 637 FOR MEDICARE OP, ALT 636 FOR OP/ED): Performed by: STUDENT IN AN ORGANIZED HEALTH CARE EDUCATION/TRAINING PROGRAM

## 2025-02-11 PROCEDURE — 2500000001 HC RX 250 WO HCPCS SELF ADMINISTERED DRUGS (ALT 637 FOR MEDICARE OP): Performed by: NURSE PRACTITIONER

## 2025-02-11 PROCEDURE — 2500000005 HC RX 250 GENERAL PHARMACY W/O HCPCS: Performed by: NURSE PRACTITIONER

## 2025-02-11 RX ADMIN — AMLODIPINE BESYLATE 10 MG: 10 TABLET ORAL at 08:53

## 2025-02-11 RX ADMIN — DEXAMETHASONE SODIUM PHOSPHATE 6 MG: 10 INJECTION, SOLUTION INTRAMUSCULAR; INTRAVENOUS at 05:12

## 2025-02-11 RX ADMIN — HEPARIN SODIUM 5000 UNITS: 5000 INJECTION INTRAVENOUS; SUBCUTANEOUS at 13:53

## 2025-02-11 RX ADMIN — OXYCODONE HYDROCHLORIDE AND ACETAMINOPHEN 1 TABLET: 5; 325 TABLET ORAL at 05:12

## 2025-02-11 RX ADMIN — CILOSTAZOL 100 MG: 100 TABLET ORAL at 21:26

## 2025-02-11 RX ADMIN — CARVEDILOL 25 MG: 25 TABLET, FILM COATED ORAL at 08:53

## 2025-02-11 RX ADMIN — ATORVASTATIN CALCIUM 80 MG: 80 TABLET, FILM COATED ORAL at 21:26

## 2025-02-11 RX ADMIN — IPRATROPIUM BROMIDE AND ALBUTEROL SULFATE 3 ML: .5; 3 SOLUTION RESPIRATORY (INHALATION) at 08:22

## 2025-02-11 RX ADMIN — OXYCODONE HYDROCHLORIDE AND ACETAMINOPHEN 1 TABLET: 5; 325 TABLET ORAL at 21:26

## 2025-02-11 RX ADMIN — ESCITALOPRAM OXALATE 10 MG: 10 TABLET ORAL at 08:54

## 2025-02-11 RX ADMIN — CILOSTAZOL 100 MG: 100 TABLET ORAL at 08:54

## 2025-02-11 RX ADMIN — Medication 3 L/MIN: at 08:22

## 2025-02-11 RX ADMIN — GUAIFENESIN 600 MG: 600 TABLET, EXTENDED RELEASE ORAL at 08:54

## 2025-02-11 RX ADMIN — GUAIFENESIN 600 MG: 600 TABLET, EXTENDED RELEASE ORAL at 21:26

## 2025-02-11 RX ADMIN — HEPARIN SODIUM 5000 UNITS: 5000 INJECTION INTRAVENOUS; SUBCUTANEOUS at 05:12

## 2025-02-11 RX ADMIN — PANTOPRAZOLE SODIUM 40 MG: 40 TABLET, DELAYED RELEASE ORAL at 08:54

## 2025-02-11 RX ADMIN — IPRATROPIUM BROMIDE AND ALBUTEROL SULFATE 3 ML: .5; 3 SOLUTION RESPIRATORY (INHALATION) at 18:28

## 2025-02-11 RX ADMIN — HEPARIN SODIUM 5000 UNITS: 5000 INJECTION INTRAVENOUS; SUBCUTANEOUS at 21:26

## 2025-02-11 RX ADMIN — MIRTAZAPINE 15 MG: 15 TABLET, FILM COATED ORAL at 21:26

## 2025-02-11 RX ADMIN — OXYCODONE HYDROCHLORIDE AND ACETAMINOPHEN 1 TABLET: 5; 325 TABLET ORAL at 13:54

## 2025-02-11 RX ADMIN — ASPIRIN 81 MG: 81 TABLET, COATED ORAL at 08:54

## 2025-02-11 RX ADMIN — CARVEDILOL 25 MG: 25 TABLET, FILM COATED ORAL at 21:26

## 2025-02-11 ASSESSMENT — COGNITIVE AND FUNCTIONAL STATUS - GENERAL
MOVING FROM LYING ON BACK TO SITTING ON SIDE OF FLAT BED WITH BEDRAILS: A LOT
TURNING FROM BACK TO SIDE WHILE IN FLAT BAD: A LOT
DAILY ACTIVITIY SCORE: 14
MOBILITY SCORE: 10
DAILY ACTIVITIY SCORE: 14
HELP NEEDED FOR BATHING: A LOT
DRESSING REGULAR LOWER BODY CLOTHING: A LOT
DRESSING REGULAR UPPER BODY CLOTHING: A LOT
MOVING TO AND FROM BED TO CHAIR: A LOT
EATING MEALS: A LITTLE
PERSONAL GROOMING: A LITTLE
STANDING UP FROM CHAIR USING ARMS: A LOT
DRESSING REGULAR LOWER BODY CLOTHING: A LOT
DRESSING REGULAR UPPER BODY CLOTHING: A LOT
TOILETING: A LOT
CLIMB 3 TO 5 STEPS WITH RAILING: TOTAL
TOILETING: A LOT
HELP NEEDED FOR BATHING: A LOT
PERSONAL GROOMING: A LITTLE
WALKING IN HOSPITAL ROOM: TOTAL
EATING MEALS: A LITTLE

## 2025-02-11 ASSESSMENT — PAIN SCALES - WONG BAKER: WONGBAKER_NUMERICALRESPONSE: NO HURT

## 2025-02-11 ASSESSMENT — PAIN DESCRIPTION - LOCATION: LOCATION: OTHER (COMMENT)

## 2025-02-11 ASSESSMENT — PAIN SCALES - GENERAL
PAINLEVEL_OUTOF10: 8
PAINLEVEL_OUTOF10: 8
PAINLEVEL_OUTOF10: 0 - NO PAIN

## 2025-02-11 NOTE — CARE PLAN
The patient's goals for the shift include      The clinical goals for the shift include maintain safety and patent airway    Over the shift, the patient did not make progress toward the following goals. Barriers to progression include pain. Recommendations to address these barriers include medicate per order.

## 2025-02-11 NOTE — CARE PLAN
The patient's goals for the shift include      The clinical goals for the shift include comfort    Over the shift, the patient did not make progress toward the following goals.

## 2025-02-11 NOTE — PROGRESS NOTES
Greg Bustos is a 64 y.o. male on day 0 of admission presenting with Chest pain, unspecified type.      Subjective   Patient was seen and examined.  He was admitted to the emergency room last night after was noted to be hypoxic at the facility where has been staying for the past 1 year.  Patient has sustained a large stroke that caused him left-sided hemiparesis in 2023.  According to the daughter patient had a positive nasal swab for COVID on 02/04, apparently following that patient had a persistent cough and the day of admission he was noted to be significantly hypoxic.  Patient is known to have COPD.  Chest x-ray in the ER was negative for pneumonia.  Patient was started on IV Decadron and aerosol treatments and he feels improvement.  He is 6-day post COVID diagnosis.       Objective   Patient is awake and alert oriented x 2in no distress    Has a congested cough    Head and neck within normal limits    Heart is regular S1-S2 without any murmur    Lungs bilateral rhonchi and expiratory wheezing    Abdomen soft and nontender    Extremities no edema      Last Recorded Vitals  /65 (BP Location: Right arm, Patient Position: Lying)   Pulse 81   Temp 36.8 °C (98.2 °F) (Temporal)   Resp (!) 22   Wt 78.5 kg (173 lb)   SpO2 92%   Intake/Output last 3 Shifts:  No intake or output data in the 24 hours ending 02/10/25 2133    Admission Weight  Weight: 78.5 kg (173 lb) (02/09/25 2330)    Daily Weight  02/09/25 : 78.5 kg (173 lb)    Image Results  ECG 12 Lead  Normal sinus rhythm  Left axis deviation  Cannot rule out Anterior infarct (cited on or before 07-APR-2024)  Abnormal ECG  When compared with ECG of 07-APR-2024 18:09,  QT has lengthened  XR chest 1 view  Narrative: Interpreted By:  Henry Fregoso,   STUDY:  XR CHEST 1 VIEW;  2/9/2025 11:43 pm      INDICATION:  Signs/Symptoms:cp/ sob.      COMPARISON:  Chest x-ray 04/07/2024      ACCESSION NUMBER(S):  [id number]      ORDERING CLINICIAN:  ABENA LEUNG       FINDINGS:          CARDIOMEDIASTINAL SILHOUETTE:  Cardiomediastinal silhouette is stable in size and configuration.  Mild unfolding of the descending thoracic aorta.      LUNGS:  No consolidation, pleural effusion or pneumothorax. Mild diffuse  interstitial prominence      ABDOMEN:  No remarkable upper abdominal findings.      BONES:  Multilevel degenerative changes of the spine      Impression: Mild diffuse interstitial prominence could be chronic or relate to  component developing interstitial edema. Correlate clinically.      MACRO:  None      Signed by: Henry Fregoso 2/10/2025 12:38 AM  Dictation workstation:   [id number]      Physical Exam    Relevant Results               Assessment/Plan    #1 acute exacerbation of COPD most likely due to recent viral infection, continue with aerosol treatments, IV steroids    2.  Status post large right hemispheric CVA with left-sided hemiparesis in 2022, patient has been staying at an assisted living since 2024/March    3.  Known history of COPD currently patient is a non-smoker    4.  History of hypertension    5.  History of peripheral vascular disease    Continue with supportive care the daughter and the patient want him to be transferred to a new facility, / aware and they are trying to find a new place for the patient.              Assessment & Plan  Chest pain, unspecified type                  Stan Rose MD

## 2025-02-11 NOTE — PROGRESS NOTES
02/11/25 0946   Discharge Planning   Living Arrangements Other (Comment)   Support Systems Children   Type of Residence Nursing home/residential care   Expected Discharge Disposition SNF   Does the patient need discharge transport arranged? Yes   RoundTrip coordination needed? Yes     [employer] reached out to patient and his daughter/MYNOR Mercer.  Sylvie is hoping her father will qualify for skilled level of care and verbalized preference for [facility], [facility] and [facility]); DSC tasked with starting referrals.  Terence KENNEDY [facility]  1600:  [facility] is the accepting facility of the three choices.  Per secure chat conversations with direct precert team, goldenrod needs to be completed to start precert; Dr Rose is aware; awaiting completion.

## 2025-02-12 PROCEDURE — 2500000004 HC RX 250 GENERAL PHARMACY W/ HCPCS (ALT 636 FOR OP/ED): Performed by: INTERNAL MEDICINE

## 2025-02-12 PROCEDURE — 2500000001 HC RX 250 WO HCPCS SELF ADMINISTERED DRUGS (ALT 637 FOR MEDICARE OP): Performed by: NURSE PRACTITIONER

## 2025-02-12 PROCEDURE — 94640 AIRWAY INHALATION TREATMENT: CPT

## 2025-02-12 PROCEDURE — 2500000002 HC RX 250 W HCPCS SELF ADMINISTERED DRUGS (ALT 637 FOR MEDICARE OP, ALT 636 FOR OP/ED): Performed by: STUDENT IN AN ORGANIZED HEALTH CARE EDUCATION/TRAINING PROGRAM

## 2025-02-12 PROCEDURE — 1200000002 HC GENERAL ROOM WITH TELEMETRY DAILY

## 2025-02-12 PROCEDURE — 97530 THERAPEUTIC ACTIVITIES: CPT | Mod: GP,CQ

## 2025-02-12 PROCEDURE — 2500000004 HC RX 250 GENERAL PHARMACY W/ HCPCS (ALT 636 FOR OP/ED): Performed by: NURSE PRACTITIONER

## 2025-02-12 PROCEDURE — 97530 THERAPEUTIC ACTIVITIES: CPT | Mod: GO

## 2025-02-12 RX ORDER — DEXAMETHASONE 4 MG/1
6 TABLET ORAL EVERY 12 HOURS SCHEDULED
Status: DISCONTINUED | OUTPATIENT
Start: 2025-02-12 | End: 2025-02-14

## 2025-02-12 RX ORDER — DEXAMETHASONE 6 MG/1
6 TABLET ORAL EVERY 12 HOURS SCHEDULED
Qty: 10 TABLET | Refills: 0 | Status: SHIPPED | OUTPATIENT
Start: 2025-02-12 | End: 2025-02-14 | Stop reason: HOSPADM

## 2025-02-12 RX ADMIN — IPRATROPIUM BROMIDE AND ALBUTEROL SULFATE 3 ML: .5; 3 SOLUTION RESPIRATORY (INHALATION) at 08:07

## 2025-02-12 RX ADMIN — OXYCODONE HYDROCHLORIDE AND ACETAMINOPHEN 1 TABLET: 5; 325 TABLET ORAL at 20:45

## 2025-02-12 RX ADMIN — DEXAMETHASONE 6 MG: 4 TABLET ORAL at 20:45

## 2025-02-12 RX ADMIN — HEPARIN SODIUM 5000 UNITS: 5000 INJECTION INTRAVENOUS; SUBCUTANEOUS at 05:01

## 2025-02-12 RX ADMIN — CARVEDILOL 25 MG: 25 TABLET, FILM COATED ORAL at 20:45

## 2025-02-12 RX ADMIN — GUAIFENESIN 600 MG: 600 TABLET, EXTENDED RELEASE ORAL at 08:38

## 2025-02-12 RX ADMIN — AMLODIPINE BESYLATE 10 MG: 10 TABLET ORAL at 08:38

## 2025-02-12 RX ADMIN — HEPARIN SODIUM 5000 UNITS: 5000 INJECTION INTRAVENOUS; SUBCUTANEOUS at 20:46

## 2025-02-12 RX ADMIN — GUAIFENESIN 600 MG: 600 TABLET, EXTENDED RELEASE ORAL at 20:45

## 2025-02-12 RX ADMIN — OXYCODONE HYDROCHLORIDE AND ACETAMINOPHEN 1 TABLET: 5; 325 TABLET ORAL at 05:01

## 2025-02-12 RX ADMIN — OXYCODONE HYDROCHLORIDE AND ACETAMINOPHEN 1 TABLET: 5; 325 TABLET ORAL at 12:42

## 2025-02-12 RX ADMIN — CILOSTAZOL 100 MG: 100 TABLET ORAL at 20:46

## 2025-02-12 RX ADMIN — IPRATROPIUM BROMIDE AND ALBUTEROL SULFATE 3 ML: .5; 3 SOLUTION RESPIRATORY (INHALATION) at 18:38

## 2025-02-12 RX ADMIN — DEXAMETHASONE SODIUM PHOSPHATE 6 MG: 10 INJECTION, SOLUTION INTRAMUSCULAR; INTRAVENOUS at 05:01

## 2025-02-12 RX ADMIN — CARVEDILOL 25 MG: 25 TABLET, FILM COATED ORAL at 08:38

## 2025-02-12 RX ADMIN — PANTOPRAZOLE SODIUM 40 MG: 40 TABLET, DELAYED RELEASE ORAL at 08:38

## 2025-02-12 RX ADMIN — ATORVASTATIN CALCIUM 80 MG: 80 TABLET, FILM COATED ORAL at 20:45

## 2025-02-12 RX ADMIN — HEPARIN SODIUM 5000 UNITS: 5000 INJECTION INTRAVENOUS; SUBCUTANEOUS at 12:42

## 2025-02-12 RX ADMIN — ESCITALOPRAM OXALATE 10 MG: 10 TABLET ORAL at 08:38

## 2025-02-12 RX ADMIN — MIRTAZAPINE 15 MG: 15 TABLET, FILM COATED ORAL at 20:45

## 2025-02-12 RX ADMIN — ASPIRIN 81 MG: 81 TABLET, COATED ORAL at 08:38

## 2025-02-12 RX ADMIN — CILOSTAZOL 100 MG: 100 TABLET ORAL at 08:38

## 2025-02-12 ASSESSMENT — PAIN SCALES - GENERAL
PAINLEVEL_OUTOF10: 5 - MODERATE PAIN
PAINLEVEL_OUTOF10: 8
PAINLEVEL_OUTOF10: 3
PAINLEVEL_OUTOF10: 8

## 2025-02-12 ASSESSMENT — COGNITIVE AND FUNCTIONAL STATUS - GENERAL
DRESSING REGULAR LOWER BODY CLOTHING: TOTAL
DAILY ACTIVITIY SCORE: 10
MOVING FROM LYING ON BACK TO SITTING ON SIDE OF FLAT BED WITH BEDRAILS: TOTAL
PERSONAL GROOMING: A LOT
MOVING TO AND FROM BED TO CHAIR: TOTAL
EATING MEALS: A LITTLE
STANDING UP FROM CHAIR USING ARMS: TOTAL
DRESSING REGULAR UPPER BODY CLOTHING: A LOT
TURNING FROM BACK TO SIDE WHILE IN FLAT BAD: TOTAL
TOILETING: TOTAL
HELP NEEDED FOR BATHING: TOTAL
MOBILITY SCORE: 6
WALKING IN HOSPITAL ROOM: TOTAL
CLIMB 3 TO 5 STEPS WITH RAILING: TOTAL

## 2025-02-12 ASSESSMENT — PAIN - FUNCTIONAL ASSESSMENT
PAIN_FUNCTIONAL_ASSESSMENT: 0-10
PAIN_FUNCTIONAL_ASSESSMENT: 0-10

## 2025-02-12 ASSESSMENT — PAIN SCALES - WONG BAKER: WONGBAKER_NUMERICALRESPONSE: HURTS LITTLE BIT

## 2025-02-12 NOTE — PROGRESS NOTES
Occupational Therapy    OT Treatment    Patient Name: Greg Bustos  MRN: [id number]  Department: [facility]  Room: [address] [place] [address]  Today's Date: 2/12/2025  Time Calculation  Start Time: 1055  Stop Time: 1119  Time Calculation (min): 24 min        Assessment:  End of Session Communication: Bedside nurse  End of Session Patient Position: Bed, 3 rail up, Alarm on     Plan:  Treatment Interventions: ADL retraining, Functional transfer training  OT Frequency: 3 times per week  OT Discharge Recommendations: Moderate intensity level of continued care  OT - OK to Discharge:  (okay to discharge to next level of care pending medical team clearance)  Treatment Interventions: ADL retraining, Functional transfer training    Subjective   Previous Visit Info:  OT Last Visit  OT Received On: 02/12/25  General:  General  Prior to Session Communication: Bedside nurse (ok'd for therapy session)  Patient Position Received: Bed, 3 rail up, Alarm on  General Comment: pt agreeable to therapy, anxious and shaky with movement  Precautions:  Medical Precautions: Fall precautions, Infection precautions, Oxygen therapy device and L/min (covid+ droplet; 2 liters)            Pain:  Pain Assessment  Pain Assessment: 0-10  0-10 (Numeric) Pain Score: 8  Pain Type:  (left leg, back, neck/head- RN notified and is aware, pt not due for medication yet)    Objective         Activities of Daily Living: UE Bathing  UE Bathing Level of Assistance:  (washed face with sba in sitting up in bed)  Functional Standing Tolerance:     Bed Mobility/Transfers: Bed Mobility  Bed Mobility:  (supine to/from sit max/dep a x2, shaky., increased pushing into right side, max cues for ue placement/motorplanning)    Transfers  Transfer:  (unable to attempt)       Sitting Balance:  Static Sitting Balance  Static Sitting-Level of Assistance:  (max a x2 with extremely pushing/severe rt lean, attending to right with head turned to right; poor midline awareness, tolerated up to 10 min at  edge of bed with attempting to achieve midline and safely sit)        Outcome Measures:[facility] Daily Activity  Putting on and taking off regular lower body clothing: Total  Bathing (including washing, rinsing, drying): Total  Putting on and taking off regular upper body clothing: A lot  Toileting, which includes using toilet, bedpan or urinal: Total  Taking care of personal grooming such as brushing teeth: A lot  Eating Meals: A little  Daily Activity - Total Score: 10        Education Documentation  No documentation found.  Education Comments  No comments found.        EDUCATION:   goals of session, bed mobility, upright posture and midline orientation, pt can benefit from continued education/training.      Goals:  Encounter Problems       Encounter Problems (Active)       impaired functional daily living skills        Pt will increase functional use of bilateral  UE to assist in adl/functional mobility transfers (Progressing)       Start:  02/10/25    Expected End:  02/24/25               impaired functional living skills       Pt will increase Grooming to min a Upper Body Bathing to min a  and Lower Body Bathing  to mod a x 2  increase Upper Body Dressing  to min a  and LE Dressing to mod a x 2 with/ without adaptive equipment as needed (Progressing)       Start:  02/10/25    Expected End:  02/24/25            Pt will increase Functional Transfers Bed Mobility to min a x 2  Sit to Stand  to mod a x 2  Functional Mobility  with /without a device to mod a x  2 with device bed <>chair/commode to increase indep/safety in patients discharge environment (Progressing)       Start:  02/10/25    Expected End:  02/24/25            Pt will increase  energy conservation /work simplification/diaphragmatic breathing performance to sba assistance to increase independence and safety  within  the patient's discharge environment  (Progressing)       Start:  02/10/25    Expected End:  02/24/25

## 2025-02-12 NOTE — CARE PLAN
The patient's goals for the shift include      The clinical goals for the shift include comfort    Over the shift, the patient did not make progress toward the following goals. Barriers to progression include weakness. Recommendations to address these barriers include PT/OT.

## 2025-02-12 NOTE — PROGRESS NOTES
02/12/25 1430   Discharge Planning   Living Arrangements Other (Comment)   Support Systems Children   Expected Discharge Disposition SNF   Does the patient need discharge transport arranged? Yes   RoundTrip coordination needed? Yes     Patient will now require updated PT/OT notes as [facility] has not been completed in order to start precert; attending notified 2/11 & 2/12 that [facility] needs to be completed but it has not yet been completed at this time.  Patient is ready for discharge.  Whiteboard updated for priority PT/OT updated notes.  Care Coordination team following.  Terence KENNEDY TCC

## 2025-02-12 NOTE — PROGRESS NOTES
Physical Therapy    Physical Therapy Treatment    Patient Name: Greg Bustos  MRN: [id number]  Department: [facility]  Room: [address] [place] [address]  Today's Date: 2/12/2025  Time Calculation  Start Time: 1056  Stop Time: 1119  Time Calculation (min): 23 min         Assessment/Plan   PT Assessment  End of Session Communication: Bedside nurse  End of Session Patient Position: Bed, 3 rail up, Alarm on (supine with HOB elevated, call bell in reach.)     PT Plan  Treatment/Interventions: Bed mobility, Transfer training, Gait training, Balance training, Strengthening, Therapeutic exercise, Therapeutic activity  PT Plan: Ongoing PT  PT Frequency: 3 times per week  PT Discharge Recommendations: Moderate intensity level of continued care  PT Recommended Transfer Status: Assist x2  PT - OK to Discharge: Yes (once medically cleared)      General Visit Information:   PT  Visit  PT Received On: 02/12/25  General  Prior to Session Communication: Bedside nurse  Patient Position Received: Bed, 3 rail up, Alarm on  General Comment: pt agreeable to therapy, anxious and shaky with movement    Subjective   Precautions:  Precautions  Medical Precautions: Fall precautions, Infection precautions, Oxygen therapy device and L/min (covid+ droplet)  Precautions Comment: droplet plus (COVID+), OOB with assist,  strict I and O, 2LO2 via NC, purewick, L sided weakness            Objective   Pain:  Pain Assessment  Pain Assessment: 0-10  0-10 (Numeric) Pain Score: 8  Pain Type:  (Left leg, back, neck/head- RN notified and is aware, pt not due for medication yet)  Cognition:     Coordination:     Postural Control:  Static Sitting Balance  Static Sitting-Comment/Number of Minutes:  (Pt tolerated static sitting EOB ~10 minutes with MaxAx2; demos heavy right lean/poor midline orientation.)     Treatments:       Bed Mobility  Bed Mobility: Yes (Supine>sit with HOB max elevated andnsit>supine obth with MaxA/Depx2; demos increased pushing to right side, max v/c for  proper hand placement/motor planning.)    Outcome Measures:  [facility] Basic Mobility  Turning from your back to your side while in a flat bed without using bedrails: Total  Moving from lying on your back to sitting on the side of a flat bed without using bedrails: Total  Moving to and from bed to chair (including a wheelchair): Total  Standing up from a chair using your arms (e.g. wheelchair or bedside chair): Total  To walk in hospital room: Total  Climbing 3-5 steps with railing: Total  Basic Mobility - Total Score: 6    Education Documentation  Mobility Training, taught by Dakotah Prieto PTA at 2/12/2025  4:59 PM.  Learner: Patient  Readiness: Acceptance  Method: Explanation  Response: Verbalizes Understanding    Education Comments  No comments found.        OP EDUCATION:       Encounter Problems       Encounter Problems (Active)       PT Problem       STG - Pt will perform passive /active assisted B LE ther ex program of 2 sets of 10  (Progressing)       Start:  02/10/25    Expected End:  02/24/25            STG - Pt will transition supine <> sitting with modAx2  (Progressing)       Start:  02/10/25    Expected End:  02/24/25            STG - Pt will transfer STS with maxAx2 (Progressing)       Start:  02/10/25    Expected End:  02/24/25            STG - Pt will SPT bed <> chair with modAx2 (Progressing)       Start:  02/10/25    Expected End:  02/24/25               Pain - Adult

## 2025-02-12 NOTE — PROGRESS NOTES
Greg Bustos is a 64 y.o. male on day 2 of admission presenting with Chest pain, unspecified type.      Subjective   Patient is awake and alert oriented x 2, need to no distress, on 3 L of nasal cannula, states that he feels improvement compared to yesterday with being less short of breath.  Continues to have a productive cough.  Patient was admitted with COPD exacerbation, he was diagnosed with COVID a week ago       Objective   Patient is awake and alert oriented x 3    Head and neck within normal limits    Lungs bilateral mild end expiratory wheezing    Heart regular S1-S2 with no murmur    Extremities no edema  Last Recorded Vitals  /70   Pulse 78   Temp 36.6 °C (97.9 °F)   Resp 18   Wt 78.5 kg (173 lb)   SpO2 92%   Intake/Output last 3 Shifts:    Intake/Output Summary (Last 24 hours) at 2/12/2025 1740  Last data filed at 2/12/2025 1030  Gross per 24 hour   Intake --   Output 1300 ml   Net -1300 ml       Admission Weight  Weight: 78.5 kg (173 lb) (02/09/25 2330)    Daily Weight  02/09/25 : 78.5 kg (173 lb)    Image Results  ECG 12 Lead  Normal sinus rhythm  Left axis deviation  Cannot rule out Anterior infarct (cited on or before 07-APR-2024)  Abnormal ECG  When compared with ECG of 07-APR-2024 18:09,  QT has lengthened  XR chest 1 view  Narrative: Interpreted By:  Henry Fregoso,   STUDY:  XR CHEST 1 VIEW;  2/9/2025 11:43 pm      INDICATION:  Signs/Symptoms:cp/ sob.      COMPARISON:  Chest x-ray 04/07/2024      ACCESSION NUMBER(S):  [id number]      ORDERING CLINICIAN:  ABENA LEUNG      FINDINGS:          CARDIOMEDIASTINAL SILHOUETTE:  Cardiomediastinal silhouette is stable in size and configuration.  Mild unfolding of the descending thoracic aorta.      LUNGS:  No consolidation, pleural effusion or pneumothorax. Mild diffuse  interstitial prominence      ABDOMEN:  No remarkable upper abdominal findings.      BONES:  Multilevel degenerative changes of the spine      Impression: Mild diffuse  interstitial prominence could be chronic or relate to  component developing interstitial edema. Correlate clinically.      MACRO:  None      Signed by: Henry Fregoso 2/10/2025 12:38 AM  Dictation workstation:   [id number]      Physical Exam    Relevant Results               Assessment/Plan    #1 acute respiratory failure most likely due to COPD exacerbation and the patient with long history of smoking and known history of COPD    2.  Recent history of COVID    3.  Left-sided hemiparesis following a stroke in 2023, patient is bed confined    4.  History of peripheral vascular disease    Continue with IV steroids, aerosol treatments, patient is gradually improving.  The family want the patient to go to a skilled nursing floor and possibly change the assisted living where he is staying at the present time.  This was discussed with the  and they would be making arrangements for discharge to SNF.              Assessment & Plan  Chest pain, unspecified type                  Stan Rose MD

## 2025-02-13 PROCEDURE — 2500000004 HC RX 250 GENERAL PHARMACY W/ HCPCS (ALT 636 FOR OP/ED): Performed by: INTERNAL MEDICINE

## 2025-02-13 PROCEDURE — 2500000005 HC RX 250 GENERAL PHARMACY W/O HCPCS: Performed by: NURSE PRACTITIONER

## 2025-02-13 PROCEDURE — 94640 AIRWAY INHALATION TREATMENT: CPT

## 2025-02-13 PROCEDURE — 2500000002 HC RX 250 W HCPCS SELF ADMINISTERED DRUGS (ALT 637 FOR MEDICARE OP, ALT 636 FOR OP/ED): Performed by: STUDENT IN AN ORGANIZED HEALTH CARE EDUCATION/TRAINING PROGRAM

## 2025-02-13 PROCEDURE — 1100000001 HC PRIVATE ROOM DAILY

## 2025-02-13 PROCEDURE — 2500000001 HC RX 250 WO HCPCS SELF ADMINISTERED DRUGS (ALT 637 FOR MEDICARE OP): Performed by: NURSE PRACTITIONER

## 2025-02-13 PROCEDURE — 2500000004 HC RX 250 GENERAL PHARMACY W/ HCPCS (ALT 636 FOR OP/ED): Performed by: NURSE PRACTITIONER

## 2025-02-13 RX ADMIN — ASPIRIN 81 MG: 81 TABLET, COATED ORAL at 10:21

## 2025-02-13 RX ADMIN — IPRATROPIUM BROMIDE AND ALBUTEROL SULFATE 3 ML: .5; 3 SOLUTION RESPIRATORY (INHALATION) at 07:31

## 2025-02-13 RX ADMIN — CARVEDILOL 25 MG: 25 TABLET, FILM COATED ORAL at 20:53

## 2025-02-13 RX ADMIN — CARVEDILOL 25 MG: 25 TABLET, FILM COATED ORAL at 10:21

## 2025-02-13 RX ADMIN — DEXAMETHASONE 6 MG: 4 TABLET ORAL at 20:54

## 2025-02-13 RX ADMIN — CILOSTAZOL 100 MG: 100 TABLET ORAL at 20:54

## 2025-02-13 RX ADMIN — HEPARIN SODIUM 5000 UNITS: 5000 INJECTION INTRAVENOUS; SUBCUTANEOUS at 14:49

## 2025-02-13 RX ADMIN — CILOSTAZOL 100 MG: 100 TABLET ORAL at 10:22

## 2025-02-13 RX ADMIN — DEXAMETHASONE 6 MG: 4 TABLET ORAL at 10:22

## 2025-02-13 RX ADMIN — IPRATROPIUM BROMIDE AND ALBUTEROL SULFATE 3 ML: .5; 3 SOLUTION RESPIRATORY (INHALATION) at 19:24

## 2025-02-13 RX ADMIN — GUAIFENESIN 600 MG: 600 TABLET, EXTENDED RELEASE ORAL at 20:53

## 2025-02-13 RX ADMIN — HEPARIN SODIUM 5000 UNITS: 5000 INJECTION INTRAVENOUS; SUBCUTANEOUS at 20:53

## 2025-02-13 RX ADMIN — OXYCODONE HYDROCHLORIDE AND ACETAMINOPHEN 1 TABLET: 5; 325 TABLET ORAL at 14:49

## 2025-02-13 RX ADMIN — HEPARIN SODIUM 5000 UNITS: 5000 INJECTION INTRAVENOUS; SUBCUTANEOUS at 06:23

## 2025-02-13 RX ADMIN — Medication 2 L/MIN: at 19:24

## 2025-02-13 RX ADMIN — GUAIFENESIN 600 MG: 600 TABLET, EXTENDED RELEASE ORAL at 10:22

## 2025-02-13 RX ADMIN — AMLODIPINE BESYLATE 10 MG: 10 TABLET ORAL at 10:22

## 2025-02-13 RX ADMIN — ATORVASTATIN CALCIUM 80 MG: 80 TABLET, FILM COATED ORAL at 20:53

## 2025-02-13 RX ADMIN — PANTOPRAZOLE SODIUM 40 MG: 40 TABLET, DELAYED RELEASE ORAL at 10:21

## 2025-02-13 RX ADMIN — OXYCODONE HYDROCHLORIDE AND ACETAMINOPHEN 1 TABLET: 5; 325 TABLET ORAL at 06:23

## 2025-02-13 RX ADMIN — OXYCODONE HYDROCHLORIDE AND ACETAMINOPHEN 1 TABLET: 5; 325 TABLET ORAL at 20:54

## 2025-02-13 RX ADMIN — ESCITALOPRAM OXALATE 10 MG: 10 TABLET ORAL at 10:21

## 2025-02-13 RX ADMIN — MIRTAZAPINE 15 MG: 15 TABLET, FILM COATED ORAL at 20:54

## 2025-02-13 ASSESSMENT — PAIN SCALES - GENERAL
PAINLEVEL_OUTOF10: 8
PAINLEVEL_OUTOF10: 0 - NO PAIN
PAINLEVEL_OUTOF10: 8
PAINLEVEL_OUTOF10: 0 - NO PAIN

## 2025-02-13 ASSESSMENT — PAIN DESCRIPTION - LOCATION: LOCATION: GENERALIZED

## 2025-02-13 ASSESSMENT — PAIN - FUNCTIONAL ASSESSMENT: PAIN_FUNCTIONAL_ASSESSMENT: 0-10

## 2025-02-13 NOTE — PROGRESS NOTES
Greg Bustos is a 64 y.o. male on day 3 of admission presenting with Chest pain, unspecified type.      Subjective   Patient is fully awake and alert oriented x 2, in general feels improvement, decreased shortness of breath and wheezing.  Appetite is good.  Cough continues to be productive.  Remains afebrile.       Objective   Patient is awake and alert oriented x 2, on 2 L of nasal cannula    Obese male with left-sided hemiparesis    Lungs bilateral mild end expiratory wheezing    Heart regular S1-S2 with no murmur    Abdomen is obese and nontender    Extremities no edema  Last Recorded Vitals  /79   Pulse 71   Temp 36.7 °C (98.1 °F)   Resp 22   Wt 78.5 kg (173 lb)   SpO2 (!) 89%   Intake/Output last 3 Shifts:    Intake/Output Summary (Last 24 hours) at 2/13/2025 0723  Last data filed at 2/12/2025 1750  Gross per 24 hour   Intake --   Output 1100 ml   Net -1100 ml       Admission Weight  Weight: 78.5 kg (173 lb) (02/09/25 2330)    Daily Weight  02/09/25 : 78.5 kg (173 lb)    Image Results  ECG 12 Lead  Normal sinus rhythm  Left axis deviation  Cannot rule out Anterior infarct (cited on or before 07-APR-2024)  Abnormal ECG  When compared with ECG of 07-APR-2024 18:09,  QT has lengthened  XR chest 1 view  Narrative: Interpreted By:  Henry Fregoso,   STUDY:  XR CHEST 1 VIEW;  2/9/2025 11:43 pm      INDICATION:  Signs/Symptoms:cp/ sob.      COMPARISON:  Chest x-ray 04/07/2024      ACCESSION NUMBER(S):  [id number]      ORDERING CLINICIAN:  ABENA LEUNG      FINDINGS:          CARDIOMEDIASTINAL SILHOUETTE:  Cardiomediastinal silhouette is stable in size and configuration.  Mild unfolding of the descending thoracic aorta.      LUNGS:  No consolidation, pleural effusion or pneumothorax. Mild diffuse  interstitial prominence      ABDOMEN:  No remarkable upper abdominal findings.      BONES:  Multilevel degenerative changes of the spine      Impression: Mild diffuse interstitial prominence could be chronic or  relate to  component developing interstitial edema. Correlate clinically.      MACRO:  None      Signed by: Henry Fregoso 2/10/2025 12:38 AM  Dictation workstation:   [id number]      Physical Exam    Relevant Results               Assessment/Plan      #1 acute respiratory failure due to COPD exacerbation, currently down to 2 L of nasal cannula    2.  Status post COVID a week ago    3.  History of CVA with left-sided hemiparesis    Continue with continue with current treatments, switch Decadron to p.o., patient is able to be discharged to a skilled nursing floor when bed is available and that reconciliation was completed.            Assessment & Plan  Chest pain, unspecified type                  Stan Rose MD

## 2025-02-13 NOTE — PROGRESS NOTES
02/13/25 0918   Discharge Planning   Living Arrangements Other (Comment)   Support Systems Children   Expected Discharge Disposition SNF   Does the patient need discharge transport arranged? Yes     [place] completed yesterday evening; direct precert team starting precert for plan to receive skilled care then transition to LTC at [facility].  Care Coordination team following.  Terence KENNEDY TCC

## 2025-02-13 NOTE — CARE PLAN
The patient's goals for the shift include      The clinical goals for the shift include Maintain safety, patent airway and comfort throughout shift      Problem: Safety - Adult  Goal: Free from fall injury  Outcome: Progressing  Flowsheets (Taken 2/13/2025 0652)  Free from fall injury: Instruct family/caregiver on patient safety     Problem: Pain - Adult  Goal: Verbalizes/displays adequate comfort level or baseline comfort level  Outcome: Progressing  Flowsheets (Taken 2/13/2025 0652)  Verbalizes/displays adequate comfort level or baseline comfort level:   Assess pain using appropriate pain scale   Encourage patient to monitor pain and request assistance   Administer analgesics based on type and severity of pain and evaluate response        show

## 2025-02-14 VITALS
RESPIRATION RATE: 20 BRPM | HEIGHT: 65 IN | SYSTOLIC BLOOD PRESSURE: 127 MMHG | BODY MASS INDEX: 28.82 KG/M2 | WEIGHT: 173 LBS | TEMPERATURE: 97.7 F | DIASTOLIC BLOOD PRESSURE: 72 MMHG | HEART RATE: 84 BPM | OXYGEN SATURATION: 95 %

## 2025-02-14 PROCEDURE — 1100000001 HC PRIVATE ROOM DAILY

## 2025-02-14 PROCEDURE — 94640 AIRWAY INHALATION TREATMENT: CPT

## 2025-02-14 PROCEDURE — 2500000001 HC RX 250 WO HCPCS SELF ADMINISTERED DRUGS (ALT 637 FOR MEDICARE OP): Performed by: INTERNAL MEDICINE

## 2025-02-14 PROCEDURE — 2500000004 HC RX 250 GENERAL PHARMACY W/ HCPCS (ALT 636 FOR OP/ED): Performed by: NURSE PRACTITIONER

## 2025-02-14 PROCEDURE — 2500000001 HC RX 250 WO HCPCS SELF ADMINISTERED DRUGS (ALT 637 FOR MEDICARE OP): Performed by: NURSE PRACTITIONER

## 2025-02-14 PROCEDURE — 2500000002 HC RX 250 W HCPCS SELF ADMINISTERED DRUGS (ALT 637 FOR MEDICARE OP, ALT 636 FOR OP/ED): Performed by: STUDENT IN AN ORGANIZED HEALTH CARE EDUCATION/TRAINING PROGRAM

## 2025-02-14 RX ORDER — DEXAMETHASONE 4 MG/1
4 TABLET ORAL EVERY 12 HOURS SCHEDULED
Status: DISCONTINUED | OUTPATIENT
Start: 2025-02-14 | End: 2025-02-14

## 2025-02-14 RX ORDER — ALUMINUM HYDROXIDE, MAGNESIUM HYDROXIDE, AND SIMETHICONE 1200; 120; 1200 MG/30ML; MG/30ML; MG/30ML
30 SUSPENSION ORAL EVERY 6 HOURS
Status: DISCONTINUED | OUTPATIENT
Start: 2025-02-14 | End: 2025-02-15 | Stop reason: HOSPADM

## 2025-02-14 RX ADMIN — IPRATROPIUM BROMIDE AND ALBUTEROL SULFATE 3 ML: .5; 3 SOLUTION RESPIRATORY (INHALATION) at 20:10

## 2025-02-14 RX ADMIN — MIRTAZAPINE 15 MG: 15 TABLET, FILM COATED ORAL at 22:03

## 2025-02-14 RX ADMIN — OXYCODONE HYDROCHLORIDE AND ACETAMINOPHEN 1 TABLET: 5; 325 TABLET ORAL at 05:50

## 2025-02-14 RX ADMIN — GUAIFENESIN 600 MG: 600 TABLET, EXTENDED RELEASE ORAL at 10:54

## 2025-02-14 RX ADMIN — CILOSTAZOL 100 MG: 100 TABLET ORAL at 22:02

## 2025-02-14 RX ADMIN — OXYCODONE HYDROCHLORIDE AND ACETAMINOPHEN 1 TABLET: 5; 325 TABLET ORAL at 12:58

## 2025-02-14 RX ADMIN — HEPARIN SODIUM 5000 UNITS: 5000 INJECTION INTRAVENOUS; SUBCUTANEOUS at 12:58

## 2025-02-14 RX ADMIN — ALUMINUM HYDROXIDE, MAGNESIUM HYDROXIDE, AND DIMETHICONE 30 ML: 200; 20; 200 SUSPENSION ORAL at 15:35

## 2025-02-14 RX ADMIN — ASPIRIN 81 MG: 81 TABLET, COATED ORAL at 10:54

## 2025-02-14 RX ADMIN — ATORVASTATIN CALCIUM 80 MG: 80 TABLET, FILM COATED ORAL at 22:03

## 2025-02-14 RX ADMIN — PANTOPRAZOLE SODIUM 40 MG: 40 TABLET, DELAYED RELEASE ORAL at 10:56

## 2025-02-14 RX ADMIN — IPRATROPIUM BROMIDE AND ALBUTEROL SULFATE 3 ML: .5; 3 SOLUTION RESPIRATORY (INHALATION) at 06:32

## 2025-02-14 RX ADMIN — GUAIFENESIN 600 MG: 600 TABLET, EXTENDED RELEASE ORAL at 22:03

## 2025-02-14 RX ADMIN — AMLODIPINE BESYLATE 10 MG: 10 TABLET ORAL at 10:53

## 2025-02-14 RX ADMIN — CILOSTAZOL 100 MG: 100 TABLET ORAL at 10:55

## 2025-02-14 RX ADMIN — ESCITALOPRAM OXALATE 10 MG: 10 TABLET ORAL at 10:54

## 2025-02-14 RX ADMIN — CARVEDILOL 25 MG: 25 TABLET, FILM COATED ORAL at 10:54

## 2025-02-14 RX ADMIN — HEPARIN SODIUM 5000 UNITS: 5000 INJECTION INTRAVENOUS; SUBCUTANEOUS at 05:50

## 2025-02-14 RX ADMIN — CARVEDILOL 25 MG: 25 TABLET, FILM COATED ORAL at 22:03

## 2025-02-14 RX ADMIN — ALUMINUM HYDROXIDE, MAGNESIUM HYDROXIDE, AND DIMETHICONE 30 ML: 200; 20; 200 SUSPENSION ORAL at 22:02

## 2025-02-14 RX ADMIN — OXYCODONE HYDROCHLORIDE AND ACETAMINOPHEN 1 TABLET: 5; 325 TABLET ORAL at 22:02

## 2025-02-14 ASSESSMENT — PAIN SCALES - GENERAL
PAINLEVEL_OUTOF10: 0 - NO PAIN
PAINLEVEL_OUTOF10: 8

## 2025-02-14 ASSESSMENT — PAIN SCALES - WONG BAKER: WONGBAKER_NUMERICALRESPONSE: NO HURT

## 2025-02-14 NOTE — PROGRESS NOTES
02/14/25 1541   Discharge Planning   Living Arrangements Other (Comment)   Support Systems Children   Type of Residence Nursing home/residential care   Expected Discharge Disposition SNF   Does the patient need discharge transport arranged? Yes   RoundTrip coordination needed? Yes     Patient with discharge orders, auth and transportation scheduled at 1745 to take him to [facility].  RN, patient and his daughter Sylvie are aware.  Terence KENNEDY TCC

## 2025-02-14 NOTE — PROGRESS NOTES
Due for nutrition therapy follow up today. Pt has discharge orders and scheduled discharge time for this evening - full assessment deferred due to pending discharge. Brief review of chart shows meal intakes ranging from %; has ONS ordered. Physician note states pt has good appetite. Please message RD if pt has any nutrition questions/concerns prior to discharge. Would suggest continuing supplementation with ONS after discharge if intake continues to fluctuate.

## 2025-02-14 NOTE — PROGRESS NOTES
Greg Bustos is a 64 y.o. male on day 4 of admission presenting with Chest pain, unspecified type.      Subjective   Patient is awake and alert oriented x 2 feels improvement in general condition with decreased shortness of breath, remains on 2 L of nasal cannula.  Has a productive cough.        Objective   Patient is awake and alert oriented x 2 in no distress    Lungs bilateral mild rhonchi and end expiratory wheezing    Heart regular S1-S2    Abdomen is obese and nontender    Extremities no edema  Last Recorded Vitals  /75   Pulse 77   Temp 36.4 °C (97.5 °F)   Resp 20   Wt 78.5 kg (173 lb)   SpO2 97%   Intake/Output last 3 Shifts:    Intake/Output Summary (Last 24 hours) at 2/14/2025 1304  Last data filed at 2/14/2025 0949  Gross per 24 hour   Intake 150 ml   Output 500 ml   Net -350 ml       Admission Weight  Weight: 78.5 kg (173 lb) (02/09/25 2330)    Daily Weight  02/09/25 : 78.5 kg (173 lb)    Image Results  ECG 12 Lead  Normal sinus rhythm  Left axis deviation  Cannot rule out Anterior infarct (cited on or before 07-APR-2024)  Abnormal ECG  When compared with ECG of 07-APR-2024 18:09,  QT has lengthened  XR chest 1 view  Narrative: Interpreted By:  Henry Fregoso,   STUDY:  XR CHEST 1 VIEW;  2/9/2025 11:43 pm      INDICATION:  Signs/Symptoms:cp/ sob.      COMPARISON:  Chest x-ray 04/07/2024      ACCESSION NUMBER(S):  [id number]      ORDERING CLINICIAN:  ABENA LEUNG      FINDINGS:          CARDIOMEDIASTINAL SILHOUETTE:  Cardiomediastinal silhouette is stable in size and configuration.  Mild unfolding of the descending thoracic aorta.      LUNGS:  No consolidation, pleural effusion or pneumothorax. Mild diffuse  interstitial prominence      ABDOMEN:  No remarkable upper abdominal findings.      BONES:  Multilevel degenerative changes of the spine      Impression: Mild diffuse interstitial prominence could be chronic or relate to  component developing interstitial edema. Correlate clinically.       MACRO:  None      Signed by: Henry Fregoso 2/10/2025 12:38 AM  Dictation workstation:   [id number]      Physical Exam    Relevant Results               Assessment/Plan    #1 acute COPD exacerbation the patient with known history of COPD and long history of smoking    2.  Recent history of COVID-19    3.  Left-sided hemiparesis secondary to a CVA in 2023    The plan is for the patient to go to a skilled nursing floor, arrangements are bein gmade by the .              Assessment & Plan  Chest pain, unspecified type                  Stan Rose MD

## 2025-02-14 NOTE — CARE PLAN
The patient's goals for the shift include      The clinical goals for the shift include Maintain safety and comfort      Problem: Pain - Adult  Goal: Verbalizes/displays adequate comfort level or baseline comfort level  Outcome: Progressing  Flowsheets (Taken 2/13/2025 0652)  Verbalizes/displays adequate comfort level or baseline comfort level:   Assess pain using appropriate pain scale   Encourage patient to monitor pain and request assistance   Administer analgesics based on type and severity of pain and evaluate response     Problem: Safety - Adult  Goal: Free from fall injury  Outcome: Progressing  Flowsheets (Taken 2/13/2025 0652)  Free from fall injury: Instruct family/caregiver on patient safety

## 2025-02-14 NOTE — CARE PLAN
The patient's goals for the shift include      The clinical goals for the shift include pt will remain hemodynamically stable throught shift    Over the shift, the patient did make progress toward the following goals.

## 2025-02-27 LAB
ATRIAL RATE: 70 BPM
P AXIS: 37 DEGREES
P OFFSET: 173 MS
P ONSET: 125 MS
PR INTERVAL: 176 MS
Q ONSET: 213 MS
QRS COUNT: 12 BEATS
QRS DURATION: 92 MS
QT INTERVAL: 554 MS
QTC CALCULATION(BAZETT): 598 MS
QTC FREDERICIA: 583 MS
R AXIS: -32 DEGREES
T AXIS: 56 DEGREES
T OFFSET: 490 MS
VENTRICULAR RATE: 70 BPM

## 2025-03-11 ENCOUNTER — NURSING HOME VISIT (OUTPATIENT)
Dept: POST ACUTE CARE | Facility: EXTERNAL LOCATION | Age: 65
End: 2025-03-11
Payer: COMMERCIAL

## 2025-03-11 DIAGNOSIS — I73.9 PERIPHERAL VASCULAR DISEASE (CMS-HCC): Primary | ICD-10-CM

## 2025-03-11 DIAGNOSIS — J44.9 CHRONIC OBSTRUCTIVE PULMONARY DISEASE, UNSPECIFIED COPD TYPE (MULTI): ICD-10-CM

## 2025-03-11 DIAGNOSIS — I63.9 CEREBROVASCULAR ACCIDENT (CVA), UNSPECIFIED MECHANISM (MULTI): ICD-10-CM

## 2025-03-11 DIAGNOSIS — U07.1 COVID-19: ICD-10-CM

## 2025-03-11 PROCEDURE — 99305 1ST NF CARE MODERATE MDM 35: CPT | Performed by: EMERGENCY MEDICINE

## 2025-03-11 NOTE — PROGRESS NOTES
Greg Bustos   64 y.o.  male  @location@            Assessment and Plan:  History and physical    Greg is a 64-year-old male patient presenting to ED from [facility] nursing [facility] for evaluation of chest pain and shortness of breath.  Patient states that he also had 2 episodes of diarrhea yesterday.  Patient states he was diagnosed with COVID at the long term facility.  Glucose 105, sodium 135, potassium 5.4 with a repeat of 3.5.  Troponin negative x 2.  Patient given dexamethasone 6 mg IV push x 1.  Patient on supplemental oxygen.  Chest x-ray showing a developing interstitial edema, BNP ordered and pending.  Patient admitted to observation for further medical management.     Chest pain/SOB/COVID-19  Admit to telemetry observation per Dr. Rose  See imaging results above  Need to clarify patient's DNR status (with his POA and the patient)  Dexamethasone 6 mg IV push every 24 hours  Supplemental oxygen  BNP ordered and pending  Repeat labs  Trend troponin  Mucinex twice daily  Telemetry monitoring     Hyperkalemia/hyponatremia  K+ 5.4  Repeat K+ 3.5 without intervention  Na+ 135  Repeat labs pending      CVA/COPD/CAD/dyslipidemia/GERD/hypertension/cardiac catheterization  #Chronic conditions  Continue home medications  Cardiac diet  PT/OT     DVT Ppx  SCDs  Heparin subcutaneous  Out of bed with assistance     Source of history: Nurse, Medical personnel, Medical record, Patient.  History limitation: None.    HPI:  History and physical    Patient is unable to give any detailed history and therefore history is obtained from the chart  No acute complaints voiced by the patient or acute concerns raised by nursing    History of hospitalization-  Greg Bustos is a 64 y.o. male presenting to the emergency department via EMS from [facility] nursing [facility] for evaluation of hypoxia.  Per EMS patient had an SpO2 of 88% on 4 L of oxygen at the facility and was just recently diagnosed with COVID-19.  Patient had also  received an oxycodone for leg pain prior to arrival to hospital.  Patient states he was diagnosed with COVID and has had 2 episodes of diarrhea yesterday.  Patient states he has also been having intermittent midsternal nonradiating chest discomfort and mild shortness of breath that has been worse with exertion.  Patient denies any recent travel or known sick contacts.  Patient denies cough, abdominal pain, nausea, or vomiting.  Patient denies fever or chills.     In ED, COVID-19 positive, glucose 105, sodium 135, potassium 5.4 with a repeat of 3.5.  Hemoglobin 13.1, hematocrit 40.2.  Patient requiring supplemental oxygen and SpO2's remain in the high 80s to low 90s.  Patient ordered dexamethasone 6 mg IV push x 1.  Last echocardiogram on file from 2023 shows an ejection fraction of 50 to 55%.  Chest x-ray showing concern for developing interstitial edema per radiology review.  BNP ordered and pending.  Blood pressure 120/70, heart rate 73, respirations 18, temperature 37.1 °C.  EKG completed showing sinus rhythm at a rate of 70 without ST elevation or depression per ED physician review.  Trending troponin.  Patient has a DNR CC in the computer from his last admission in April 2024.  In discussion with patient he indicates that he would rather be a DNR CCA however a note from his visit with palliative is suggesting a full code.  Patient appears to lack the understanding needed to make this decision.  Patient's POA is his daughter who will need to be contacted to clear up his CODE STATUS.  Per me at this time patient states DNR CCA.  Order placed.  Patient admitted to telemetry observation under the care of Dr. Rose who will continue to follow.  I was asked to do H&P and place initial admission orders.     Past Medical History  CVA, chronic diarrhea, COPD, dyslipidemia, hypertension, GERD, PVD, restless leg syndrome, CAD, tremor, right lower extremity weakness  Surgical History  Cholecystectomy, cataract surgery,  cardiac catheterization, ankle surgery        Social History  Former smoker, no drug use, no alcohol use, residing at skilled nursing facility  Family History  Reviewed and noncontributory     Allergies  Hydrocodone-acetaminophen, Divalproex sodium, Gabapentin, and Tramadol    Current Outpatient Medications   Medication Sig Dispense Refill    amLODIPine (Norvasc) 10 mg tablet Take 1 tablet (10 mg) by mouth once daily.      aspirin 81 mg EC tablet Take 1 tablet (81 mg) by mouth once daily.      atorvastatin (Lipitor) 80 mg tablet Take 1 tablet (80 mg) by mouth once daily at bedtime.      carvedilol (Coreg) 25 mg tablet Take 1 tablet (25 mg) by mouth 2 times a day. Take with food.      cilostazol (Pletal) 100 mg tablet Take 1 tablet (100 mg) by mouth 2 times a day.      escitalopram (Lexapro) 10 mg tablet Take 1 tablet (10 mg) by mouth once daily.      fluticasone-umeclidin-vilanter (TRELEGY-ELLIPTA) 100-62.5-25 mcg blister with device Inhale 1 puff once daily. 30 each 0    ipratropium-albuteroL (Duo-Neb) 0.5-2.5 mg/3 mL nebulizer solution Take 3 mL by nebulization 2 times a day. 180 mL 11    mirtazapine (Remeron) 15 mg tablet Take 1 tablet (15 mg) by mouth once daily at bedtime.      oxyCODONE-acetaminophen (Percocet) 5-325 mg tablet Take 1 tablet by mouth every 8 hours.      pantoprazole (ProtoNix) 40 mg EC tablet Take 1 tablet (40 mg) by mouth once daily.       No current facility-administered medications for this visit.       Physical Exam:  Vital signs as per nursing/MA documentation were reviewed  General appearance: Alert and in no acute distress  HEENT: Normal Inspection  Neck - Normal Inspection  Respiratory : No respiratory distress. Lungs are clear   Cardiovascular: heart rate normal. No gallop  Back - normal inspection  Skin inspection:Warm  Musculoskeletal : No deformities  Neuro : Limited exam. Baseline    ROS: Review of symptoms is negative except for what is mentioned in HPI    Results/Data  Records  including but not limited to electronic medical records, relevant lab work and imaging from patient's health care facility encounter were reviewed and independently verified      Charting was completed using voice recognition technology and may include unintended errors.    Discussed with patient/family, RN, and NP.

## 2025-03-11 NOTE — LETTER
Patient: Greg Bustos  : 1960    Encounter Date: 2025    Greg Bustos   64 y.o.  male  @location@            Assessment and Plan:  History and physical    Greg is a 64-year-old male patient presenting to ED from [facility] for evaluation of chest pain and shortness of breath.  Patient states that he also had 2 episodes of diarrhea yesterday.  Patient states he was diagnosed with COVID at the [facility] facility.  Glucose 105, sodium 135, potassium 5.4 with a repeat of 3.5.  Troponin negative x 2.  Patient given dexamethasone 6 mg IV push x 1.  Patient on supplemental oxygen.  Chest x-ray showing a developing interstitial edema, BNP ordered and pending.  Patient admitted to observation for further medical management.     Chest pain/SOB/COVID-19  Admit to telemetry observation per Dr. Rose  See imaging results above  Need to clarify patient's DNR status (with his POA and the patient)  Dexamethasone 6 mg IV push every 24 hours  Supplemental oxygen  BNP ordered and pending  Repeat labs  Trend troponin  Mucinex twice daily  Telemetry monitoring     Hyperkalemia/hyponatremia  K+ 5.4  Repeat K+ 3.5 without intervention  Na+ 135  Repeat labs pending      CVA/COPD/CAD/dyslipidemia/GERD/hypertension/cardiac catheterization  #Chronic conditions  Continue home medications  Cardiac diet  PT/OT     DVT Ppx  SCDs  Heparin subcutaneous  Out of bed with assistance     Source of history: Nurse, Medical personnel, Medical record, Patient.  History limitation: None.    HPI:  History and physical    Patient is unable to give any detailed history and therefore history is obtained from the chart  No acute complaints voiced by the patient or acute concerns raised by nursing    History of hospitalization-  Greg Bustos is a 64 y.o. male presenting to the emergency department via EMS from [facility] for evaluation of hypoxia.  Per EMS patient had an SpO2 of 88% on 4 L of oxygen at the  facility and was just recently diagnosed with COVID-19.  Patient had also received an oxycodone for leg pain prior to arrival to hospital.  Patient states he was diagnosed with COVID and has had 2 episodes of diarrhea yesterday.  Patient states he has also been having intermittent midsternal nonradiating chest discomfort and mild shortness of breath that has been worse with exertion.  Patient denies any recent travel or known sick contacts.  Patient denies cough, abdominal pain, nausea, or vomiting.  Patient denies fever or chills.     In ED, COVID-19 positive, glucose 105, sodium 135, potassium 5.4 with a repeat of 3.5.  Hemoglobin 13.1, hematocrit 40.2.  Patient requiring supplemental oxygen and SpO2's remain in the high 80s to low 90s.  Patient ordered dexamethasone 6 mg IV push x 1.  Last echocardiogram on file from 2023 shows an ejection fraction of 50 to 55%.  Chest x-ray showing concern for developing interstitial edema per radiology review.  BNP ordered and pending.  Blood pressure 120/70, heart rate 73, respirations 18, temperature 37.1 °C.  EKG completed showing sinus rhythm at a rate of 70 without ST elevation or depression per ED physician review.  Trending troponin.  Patient has a DNR CC in the computer from his last admission in April 2024.  In discussion with patient he indicates that he would rather be a DNR CCA however a note from his visit with palliative is suggesting a full code.  Patient appears to lack the understanding needed to make this decision.  Patient's POA is his daughter who will need to be contacted to clear up his CODE STATUS.  Per me at this time patient states DNR CCA.  Order placed.  Patient admitted to telemetry observation under the care of Dr. Rose who will continue to follow.  I was asked to do H&P and place initial admission orders.     Past Medical History  CVA, chronic diarrhea, COPD, dyslipidemia, hypertension, GERD, PVD, restless leg syndrome, CAD, tremor, right lower  extremity weakness  Surgical History  Cholecystectomy, cataract surgery, cardiac catheterization, ankle surgery        Social History  Former smoker, no drug use, no alcohol use, residing at skilled nursing facility  Family History  Reviewed and noncontributory     Allergies  Hydrocodone-acetaminophen, Divalproex sodium, Gabapentin, and Tramadol    Current Outpatient Medications   Medication Sig Dispense Refill   • amLODIPine (Norvasc) 10 mg tablet Take 1 tablet (10 mg) by mouth once daily.     • aspirin 81 mg EC tablet Take 1 tablet (81 mg) by mouth once daily.     • atorvastatin (Lipitor) 80 mg tablet Take 1 tablet (80 mg) by mouth once daily at bedtime.     • carvedilol (Coreg) 25 mg tablet Take 1 tablet (25 mg) by mouth 2 times a day. Take with food.     • cilostazol (Pletal) 100 mg tablet Take 1 tablet (100 mg) by mouth 2 times a day.     • escitalopram (Lexapro) 10 mg tablet Take 1 tablet (10 mg) by mouth once daily.     • fluticasone-umeclidin-vilanter (TRELEGY-ELLIPTA) 100-62.5-25 mcg blister with device Inhale 1 puff once daily. 30 each 0   • ipratropium-albuteroL (Duo-Neb) 0.5-2.5 mg/3 mL nebulizer solution Take 3 mL by nebulization 2 times a day. 180 mL 11   • mirtazapine (Remeron) 15 mg tablet Take 1 tablet (15 mg) by mouth once daily at bedtime.     • oxyCODONE-acetaminophen (Percocet) 5-325 mg tablet Take 1 tablet by mouth every 8 hours.     • pantoprazole (ProtoNix) 40 mg EC tablet Take 1 tablet (40 mg) by mouth once daily.       No current facility-administered medications for this visit.       Physical Exam:  Vital signs as per nursing/MA documentation were reviewed  General appearance: Alert and in no acute distress  HEENT: Normal Inspection  Neck - Normal Inspection  Respiratory : No respiratory distress. Lungs are clear   Cardiovascular: heart rate normal. No gallop  Back - normal inspection  Skin inspection:Warm  Musculoskeletal : No deformities  Neuro : Limited exam. Baseline    ROS: Review of  symptoms is negative except for what is mentioned in HPI    Results/Data  Records including but not limited to electronic medical records, relevant lab work and imaging from patient's health care facility encounter were reviewed and independently verified      Charting was completed using voice recognition technology and may include unintended errors.    Discussed with patient/family, RN, and NP.      Electronically Signed By: Trell Guzman MD   3/11/25  3:00 PM

## 2025-04-08 ENCOUNTER — NURSING HOME VISIT (OUTPATIENT)
Dept: POST ACUTE CARE | Facility: EXTERNAL LOCATION | Age: 65
End: 2025-04-08
Payer: COMMERCIAL

## 2025-04-08 DIAGNOSIS — I73.9 PERIPHERAL VASCULAR DISEASE (CMS-HCC): ICD-10-CM

## 2025-04-08 DIAGNOSIS — K21.9 GASTROESOPHAGEAL REFLUX DISEASE, UNSPECIFIED WHETHER ESOPHAGITIS PRESENT: ICD-10-CM

## 2025-04-08 DIAGNOSIS — I25.10 ARTERIOSCLEROSIS OF CORONARY ARTERY: ICD-10-CM

## 2025-04-08 DIAGNOSIS — J44.9 CHRONIC OBSTRUCTIVE PULMONARY DISEASE, UNSPECIFIED COPD TYPE (MULTI): ICD-10-CM

## 2025-04-08 DIAGNOSIS — I63.9 CEREBROVASCULAR ACCIDENT (CVA), UNSPECIFIED MECHANISM (MULTI): Primary | ICD-10-CM

## 2025-04-08 NOTE — LETTER
Patient: Greg Bustos  : 1960    Encounter Date: 2025    Greg Bustos   64 y.o.  male  @location@            Assessment and Plan:      Greg is a 64-year-old male patient presenting to ED from [facility] nursing facility for evaluation of chest pain and shortness of breath.  Patient states that he also had 2 episodes of diarrhea yesterday.  Patient states he was diagnosed with COVID at the custodial facility.  Glucose 105, sodium 135, potassium 5.4 with a repeat of 3.5.  Troponin negative x 2.  Patient given dexamethasone 6 mg IV push x 1.  Patient on supplemental oxygen.  Chest x-ray showing a developing interstitial edema, BNP ordered and pending.  Patient admitted to observation for further medical management.     Chest pain/SOB/COVID-19  Admit to telemetry observation per Dr. Rose  See imaging results above  Need to clarify patient's DNR status (with his POA and the patient)  Dexamethasone 6 mg IV push every 24 hours  Supplemental oxygen  BNP ordered and pending  Repeat labs  Trend troponin  Mucinex twice daily  Telemetry monitoring     Hyperkalemia/hyponatremia  K+ 5.4  Repeat K+ 3.5 without intervention  Na+ 135  Repeat labs pending      CVA/COPD/CAD/dyslipidemia/GERD/hypertension/cardiac catheterization  #Chronic conditions  Continue home medications  Cardiac diet  PT/OT     DVT Ppx  SCDs  Heparin subcutaneous  Out of bed with assistance     -Fall prevention    -Cognitive engagement     -Monitor and treat blood pressure     -Aggressive decubitus ulcer prevention.     -Bowel and bladder care     -Optimal nutrition and supplementation as needed     -GI  and DVT prophylaxis     -Symptom control     -Ambulation as tolerated     -Will follow    Charting is done using voice recognition software and may contain errors which have not been completely corrected    Source of history: Nurse, Medical personnel, Medical record, Patient.  History limitation: None.    HPI:      Patient is unable to give  any detailed history and therefore history is obtained from the chart  No acute complaints voiced by the patient or acute concerns raised by nursing    History of hospitalization-  Greg Bustos is a 64 y.o. male presenting to the emergency department via EMS from [facility] for evaluation of hypoxia.  Per EMS patient had an SpO2 of 88% on 4 L of oxygen at the facility and was just recently diagnosed with COVID-19.  Patient had also received an oxycodone for leg pain prior to arrival to hospital.  Patient states he was diagnosed with COVID and has had 2 episodes of diarrhea yesterday.  Patient states he has also been having intermittent midsternal nonradiating chest discomfort and mild shortness of breath that has been worse with exertion.  Patient denies any recent travel or known sick contacts.  Patient denies cough, abdominal pain, nausea, or vomiting.  Patient denies fever or chills.     In ED, COVID-19 positive, glucose 105, sodium 135, potassium 5.4 with a repeat of 3.5.  Hemoglobin 13.1, hematocrit 40.2.  Patient requiring supplemental oxygen and SpO2's remain in the high 80s to low 90s.  Patient ordered dexamethasone 6 mg IV push x 1.  Last echocardiogram on file from 2023 shows an ejection fraction of 50 to 55%.  Chest x-ray showing concern for developing interstitial edema per radiology review.  BNP ordered and pending.  Blood pressure 120/70, heart rate 73, respirations 18, temperature 37.1 °C.  EKG completed showing sinus rhythm at a rate of 70 without ST elevation or depression per ED physician review.  Trending troponin.  Patient has a DNR CC in the computer from his last admission in April 2024.  In discussion with patient he indicates that he would rather be a DNR CCA however a note from his visit with palliative is suggesting a full code.  Patient appears to lack the understanding needed to make this decision.  Patient's POA is his daughter who will need to be contacted to clear up  his CODE STATUS.  Per me at this time patient states DNR CCA.  Order placed.  Patient admitted to telemetry observation under the care of Dr. Rose who will continue to follow.  I was asked to do H&P and place initial admission orders.     Past Medical History  CVA, chronic diarrhea, COPD, dyslipidemia, hypertension, GERD, PVD, restless leg syndrome, CAD, tremor, right lower extremity weakness  Surgical History  Cholecystectomy, cataract surgery, cardiac catheterization, ankle surgery        Social History  Former smoker, no drug use, no alcohol use, residing at skilled nursing facility  Family History  Reviewed and noncontributory     Allergies  Hydrocodone-acetaminophen, Divalproex sodium, Gabapentin, and Tramadol    Current Outpatient Medications   Medication Sig Dispense Refill   • amLODIPine (Norvasc) 10 mg tablet Take 1 tablet (10 mg) by mouth once daily.     • aspirin 81 mg EC tablet Take 1 tablet (81 mg) by mouth once daily.     • atorvastatin (Lipitor) 80 mg tablet Take 1 tablet (80 mg) by mouth once daily at bedtime.     • carvedilol (Coreg) 25 mg tablet Take 1 tablet (25 mg) by mouth 2 times a day. Take with food.     • cilostazol (Pletal) 100 mg tablet Take 1 tablet (100 mg) by mouth 2 times a day.     • escitalopram (Lexapro) 10 mg tablet Take 1 tablet (10 mg) by mouth once daily.     • fluticasone-umeclidin-vilanter (TRELEGY-ELLIPTA) 100-62.5-25 mcg blister with device Inhale 1 puff once daily. 30 each 0   • ipratropium-albuteroL (Duo-Neb) 0.5-2.5 mg/3 mL nebulizer solution Take 3 mL by nebulization 2 times a day. 180 mL 11   • mirtazapine (Remeron) 15 mg tablet Take 1 tablet (15 mg) by mouth once daily at bedtime.     • oxyCODONE-acetaminophen (Percocet) 5-325 mg tablet Take 1 tablet by mouth every 8 hours.     • pantoprazole (ProtoNix) 40 mg EC tablet Take 1 tablet (40 mg) by mouth once daily.       No current facility-administered medications for this visit.       Physical Exam:  Vital signs as per  nursing/MA documentation were reviewed  General appearance: Alert and in no acute distress  HEENT: Normal Inspection  Neck - Normal Inspection  Respiratory : No respiratory distress. Lungs are clear   Cardiovascular: heart rate normal. No gallop  Back - normal inspection  Skin inspection:Warm  Musculoskeletal : No deformities  Neuro : Limited exam. Baseline    ROS: Review of symptoms is negative except for what is mentioned in HPI    Results/Data  Records including but not limited to electronic medical records, relevant lab work and imaging from patient's health care facility encounter were reviewed and independently verified      Charting was completed using voice recognition technology and may include unintended errors.    Discussed with patient/family, RN, and NP.      Electronically Signed By: Trell Guzman MD   4/12/25  4:01 PM

## 2025-04-12 NOTE — PROGRESS NOTES
Greg Bustos   64 y.o.  male  @location@            Assessment and Plan:      Greg is a 64-year-old male patient presenting to ED from skilled nursing facility for evaluation of chest pain and shortness of breath.  Patient states that he also had 2 episodes of diarrhea yesterday.  Patient states he was diagnosed with COVID at the [facility] facility.  Glucose 105, sodium 135, potassium 5.4 with a repeat of 3.5.  Troponin negative x 2.  Patient given dexamethasone 6 mg IV push x 1.  Patient on supplemental oxygen.  Chest x-ray showing a developing interstitial edema, BNP ordered and pending.  Patient admitted to observation for further medical management.     Chest pain/SOB/COVID-19  Admit to telemetry observation per Dr. Rose  See imaging results above  Need to clarify patient's DNR status (with his POA and the patient)  Dexamethasone 6 mg IV push every 24 hours  Supplemental oxygen  BNP ordered and pending  Repeat labs  Trend troponin  Mucinex twice daily  Telemetry monitoring     Hyperkalemia/hyponatremia  K+ 5.4  Repeat K+ 3.5 without intervention  Na+ 135  Repeat labs pending      CVA/COPD/CAD/dyslipidemia/GERD/hypertension/cardiac catheterization  #Chronic conditions  Continue home medications  Cardiac diet  PT/OT     DVT Ppx  SCDs  Heparin subcutaneous  Out of bed with assistance     -Fall prevention    -Cognitive engagement     -Monitor and treat blood pressure     -Aggressive decubitus ulcer prevention.     -Bowel and bladder care     -Optimal nutrition and supplementation as needed     -GI  and DVT prophylaxis     -Symptom control     -Ambulation as tolerated     -Will follow    Charting is done using voice recognition software and may contain errors which have not been completely corrected    Source of history: Nurse, Medical personnel, Medical record, Patient.  History limitation: None.    HPI:      Patient is unable to give any detailed history and therefore history is obtained from the  chart  No acute complaints voiced by the patient or acute concerns raised by nursing    History of hospitalization-  Greg Bustos is a 64 y.o. male presenting to the emergency department via EMS from [facility] for evaluation of hypoxia.  Per EMS patient had an SpO2 of 88% on 4 L of oxygen at the facility and was just recently diagnosed with COVID-19.  Patient had also received an oxycodone for leg pain prior to arrival to hospital.  Patient states he was diagnosed with COVID and has had 2 episodes of diarrhea yesterday.  Patient states he has also been having intermittent midsternal nonradiating chest discomfort and mild shortness of breath that has been worse with exertion.  Patient denies any recent travel or known sick contacts.  Patient denies cough, abdominal pain, nausea, or vomiting.  Patient denies fever or chills.     In ED, COVID-19 positive, glucose 105, sodium 135, potassium 5.4 with a repeat of 3.5.  Hemoglobin 13.1, hematocrit 40.2.  Patient requiring supplemental oxygen and SpO2's remain in the high 80s to low 90s.  Patient ordered dexamethasone 6 mg IV push x 1.  Last echocardiogram on file from 2023 shows an ejection fraction of 50 to 55%.  Chest x-ray showing concern for developing interstitial edema per radiology review.  BNP ordered and pending.  Blood pressure 120/70, heart rate 73, respirations 18, temperature 37.1 °C.  EKG completed showing sinus rhythm at a rate of 70 without ST elevation or depression per ED physician review.  Trending troponin.  Patient has a DNR CC in the computer from his last admission in April 2024.  In discussion with patient he indicates that he would rather be a DNR CCA however a note from his visit with palliative is suggesting a full code.  Patient appears to lack the understanding needed to make this decision.  Patient's POA is his daughter who will need to be contacted to clear up his CODE STATUS.  Per me at this time patient states DNR CCA.   Order placed.  Patient admitted to telemetry observation under the care of Dr. Rose who will continue to follow.  I was asked to do H&P and place initial admission orders.     Past Medical History  CVA, chronic diarrhea, COPD, dyslipidemia, hypertension, GERD, PVD, restless leg syndrome, CAD, tremor, right lower extremity weakness  Surgical History  Cholecystectomy, cataract surgery, cardiac catheterization, ankle surgery        Social History  Former smoker, no drug use, no alcohol use, residing at skilled nursing facility  Family History  Reviewed and noncontributory     Allergies  Hydrocodone-acetaminophen, Divalproex sodium, Gabapentin, and Tramadol    Current Outpatient Medications   Medication Sig Dispense Refill    amLODIPine (Norvasc) 10 mg tablet Take 1 tablet (10 mg) by mouth once daily.      aspirin 81 mg EC tablet Take 1 tablet (81 mg) by mouth once daily.      atorvastatin (Lipitor) 80 mg tablet Take 1 tablet (80 mg) by mouth once daily at bedtime.      carvedilol (Coreg) 25 mg tablet Take 1 tablet (25 mg) by mouth 2 times a day. Take with food.      cilostazol (Pletal) 100 mg tablet Take 1 tablet (100 mg) by mouth 2 times a day.      escitalopram (Lexapro) 10 mg tablet Take 1 tablet (10 mg) by mouth once daily.      fluticasone-umeclidin-vilanter (TRELEGY-ELLIPTA) 100-62.5-25 mcg blister with device Inhale 1 puff once daily. 30 each 0    ipratropium-albuteroL (Duo-Neb) 0.5-2.5 mg/3 mL nebulizer solution Take 3 mL by nebulization 2 times a day. 180 mL 11    mirtazapine (Remeron) 15 mg tablet Take 1 tablet (15 mg) by mouth once daily at bedtime.      oxyCODONE-acetaminophen (Percocet) 5-325 mg tablet Take 1 tablet by mouth every 8 hours.      pantoprazole (ProtoNix) 40 mg EC tablet Take 1 tablet (40 mg) by mouth once daily.       No current facility-administered medications for this visit.       Physical Exam:  Vital signs as per nursing/MA documentation were reviewed  General appearance: Alert and in  no acute distress  HEENT: Normal Inspection  Neck - Normal Inspection  Respiratory : No respiratory distress. Lungs are clear   Cardiovascular: heart rate normal. No gallop  Back - normal inspection  Skin inspection:Warm  Musculoskeletal : No deformities  Neuro : Limited exam. Baseline    ROS: Review of symptoms is negative except for what is mentioned in HPI    Results/Data  Records including but not limited to electronic medical records, relevant lab work and imaging from patient's health care facility encounter were reviewed and independently verified      Charting was completed using voice recognition technology and may include unintended errors.    Discussed with patient/family, RN, and NP.

## 2025-05-13 ENCOUNTER — NURSING HOME VISIT (OUTPATIENT)
Dept: POST ACUTE CARE | Facility: EXTERNAL LOCATION | Age: 65
End: 2025-05-13
Payer: COMMERCIAL

## 2025-05-13 DIAGNOSIS — K21.9 GASTROESOPHAGEAL REFLUX DISEASE, UNSPECIFIED WHETHER ESOPHAGITIS PRESENT: ICD-10-CM

## 2025-05-13 DIAGNOSIS — Z95.5 S/P CORONARY ARTERY STENT PLACEMENT: ICD-10-CM

## 2025-05-13 DIAGNOSIS — I63.9 CEREBROVASCULAR ACCIDENT (CVA), UNSPECIFIED MECHANISM (MULTI): Primary | ICD-10-CM

## 2025-05-13 DIAGNOSIS — J44.9 CHRONIC OBSTRUCTIVE PULMONARY DISEASE, UNSPECIFIED COPD TYPE (MULTI): ICD-10-CM

## 2025-05-13 DIAGNOSIS — I25.119 CORONARY ARTERY DISEASE INVOLVING NATIVE CORONARY ARTERY OF NATIVE HEART WITH ANGINA PECTORIS: ICD-10-CM

## 2025-05-13 PROCEDURE — 99309 SBSQ NF CARE MODERATE MDM 30: CPT | Performed by: EMERGENCY MEDICINE

## 2025-05-13 NOTE — LETTER
Patient: Greg Bustos  : 1960    Encounter Date: 2025    Greg Bustos   65 y.o.  male  @location@            Assessment and Plan:      Greg is a 64-year-old male patient presenting to ED from [facility] nursing facility for evaluation of chest pain and shortness of breath.  Patient states that he also had 2 episodes of diarrhea yesterday.  Patient states he [facility].  Glucose 105, sodium 135, potassium 5.4 with a repeat of 3.5.  Troponin negative x 2.  Patient given dexamethasone 6 mg IV push x 1.  Patient on supplemental oxygen.  Chest x-ray showing a developing interstitial edema, BNP ordered and pending.  Patient admitted to observation for further medical management.     Chest pain/SOB/COVID-19  Admit to telemetry observation per Dr. Rose  See imaging results above  Need to clarify patient's DNR status (with his POA and the patient)  Dexamethasone 6 mg IV push every 24 hours  Supplemental oxygen  BNP ordered and pending  Repeat labs  Trend troponin  Mucinex twice daily  Telemetry monitoring     Hyperkalemia/hyponatremia  K+ 5.4  Repeat K+ 3.5 without intervention  Na+ 135  Repeat labs pending      CVA/COPD/CAD/dyslipidemia/GERD/hypertension/cardiac catheterization  #Chronic conditions  Continue home medications  Cardiac diet  PT/OT     DVT Ppx  SCDs  Heparin subcutaneous  Out of bed with assistance     Provide safe environment for the patient     Continue current medication regimen     OT PT and speech therapy     Bowel and bladder,skin care     Nutritional support     monitor and treat blood glucose     GI  and DVT prophylaxis     PRN medications     Periodic lab work    Regular Follow up    Charting is done using voice recognition software and may contain errors which have not been completely corrected      Source of history: Nurse, Medical personnel, Medical record, Patient.  History limitation: None.    HPI:      Patient is unable to give any  detailed history and therefore history is obtained from the chart  No acute complaints voiced by the patient or acute concerns raised by nursing    History of hospitalization-  Greg Bustos is a 64 y.o. male presenting to the emergency department via EMS from [facility] for evaluation of hypoxia.  Per EMS patient had an SpO2 of 88% on 4 L of oxygen at the facility and was just recently diagnosed with COVID-19.  Patient had also received an oxycodone for leg pain prior to arrival to hospital.  Patient states he was diagnosed with COVID and has had 2 episodes of diarrhea yesterday.  Patient states he has also been having intermittent midsternal nonradiating chest discomfort and mild shortness of breath that has been worse with exertion.  Patient denies any recent travel or known sick contacts.  Patient denies cough, abdominal pain, nausea, or vomiting.  Patient denies fever or chills.     In ED, COVID-19 positive, glucose 105, sodium 135, potassium 5.4 with a repeat of 3.5.  Hemoglobin 13.1, hematocrit 40.2.  Patient requiring supplemental oxygen and SpO2's remain in the high 80s to low 90s.  Patient ordered dexamethasone 6 mg IV push x 1.  Last echocardiogram on file from 2023 shows an ejection fraction of 50 to 55%.  Chest x-ray showing concern for developing interstitial edema per radiology review.  BNP ordered and pending.  Blood pressure 120/70, heart rate 73, respirations 18, temperature 37.1 °C.  EKG completed showing sinus rhythm at a rate of 70 without ST elevation or depression per ED physician review.  Trending troponin.  Patient has a DNR CC in the computer from his last admission in April 2024.  In discussion with patient he indicates that he would rather be a DNR CCA however a note from his visit with palliative is suggesting a full code.  Patient appears to lack the understanding needed to make this decision.  Patient's POA is his daughter who will need to be contacted to clear up his  CODE STATUS.  Per me at this time patient states DNR CCA.  Order placed.  Patient admitted to telemetry observation under the care of Dr. Rose who will continue to follow.  I was asked to do H&P and place initial admission orders.     Past Medical History  CVA, chronic diarrhea, COPD, dyslipidemia, hypertension, GERD, PVD, restless leg syndrome, CAD, tremor, right lower extremity weakness  Surgical History  Cholecystectomy, cataract surgery, cardiac catheterization, ankle surgery        Social History  Former smoker, no drug use, no alcohol use, residing at skilled nursing facility  Family History  Reviewed and noncontributory     Allergies  Hydrocodone-acetaminophen, Divalproex sodium, Gabapentin, and Tramadol    Current Outpatient Medications   Medication Sig Dispense Refill   • amLODIPine (Norvasc) 10 mg tablet Take 1 tablet (10 mg) by mouth once daily.     • aspirin 81 mg EC tablet Take 1 tablet (81 mg) by mouth once daily.     • atorvastatin (Lipitor) 80 mg tablet Take 1 tablet (80 mg) by mouth once daily at bedtime.     • carvedilol (Coreg) 25 mg tablet Take 1 tablet (25 mg) by mouth 2 times a day. Take with food.     • cilostazol (Pletal) 100 mg tablet Take 1 tablet (100 mg) by mouth 2 times a day.     • escitalopram (Lexapro) 10 mg tablet Take 1 tablet (10 mg) by mouth once daily.     • fluticasone-umeclidin-vilanter (TRELEGY-ELLIPTA) 100-62.5-25 mcg blister with device Inhale 1 puff once daily. 30 each 0   • ipratropium-albuteroL (Duo-Neb) 0.5-2.5 mg/3 mL nebulizer solution Take 3 mL by nebulization 2 times a day. 180 mL 11   • mirtazapine (Remeron) 15 mg tablet Take 1 tablet (15 mg) by mouth once daily at bedtime.     • oxyCODONE-acetaminophen (Percocet) 5-325 mg tablet Take 1 tablet by mouth every 8 hours.     • pantoprazole (ProtoNix) 40 mg EC tablet Take 1 tablet (40 mg) by mouth once daily.       No current facility-administered medications for this visit.       Physical Exam:  Vital signs as per  nursing/MA documentation were reviewed  General appearance: Alert and in no acute distress  HEENT: Normal Inspection  Neck - Normal Inspection  Respiratory : No respiratory distress. Lungs are clear   Cardiovascular: heart rate normal. No gallop  Back - normal inspection  Skin inspection:Warm  Musculoskeletal : No deformities  Neuro : Limited exam. Baseline    ROS: Review of symptoms is negative except for what is mentioned in HPI    Results/Data  Records including but not limited to electronic medical records, relevant lab work and imaging from patient's health care facility encounter were reviewed and independently verified      Charting was completed using voice recognition technology and may include unintended errors.    Discussed with patient/family, RN, and NP.    Electronically Signed By: Trell Guzman MD   5/13/25  5:44 PM

## 2025-05-13 NOTE — PROGRESS NOTES
Greg Bustos   65 y.o.  male  @location@            Assessment and Plan:      Greg is a 64-year-old male patient presenting to ED from skilled nursing facility for evaluation of chest pain and shortness of breath.  Patient states that he also had 2 episodes of diarrhea yesterday.  Patient states he was diagnosed with COVID at the longterm facility.  Glucose 105, sodium 135, potassium 5.4 with a repeat of 3.5.  Troponin negative x 2.  Patient given dexamethasone 6 mg IV push x 1.  Patient on supplemental oxygen.  Chest x-ray showing a developing interstitial edema, BNP ordered and pending.  Patient admitted to observation for further medical management.     Chest pain/SOB/COVID-19  Admit to telemetry observation per Dr. Rose  See imaging results above  Need to clarify patient's DNR status (with his POA and the patient)  Dexamethasone 6 mg IV push every 24 hours  Supplemental oxygen  BNP ordered and pending  Repeat labs  Trend troponin  Mucinex twice daily  Telemetry monitoring     Hyperkalemia/hyponatremia  K+ 5.4  Repeat K+ 3.5 without intervention  Na+ 135  Repeat labs pending      CVA/COPD/CAD/dyslipidemia/GERD/hypertension/cardiac catheterization  #Chronic conditions  Continue home medications  Cardiac diet  PT/OT     DVT Ppx  SCDs  Heparin subcutaneous  Out of bed with assistance     Provide safe environment for the patient     Continue current medication regimen     OT PT and speech therapy     Bowel and bladder,skin care     Nutritional support     monitor and treat blood glucose     GI  and DVT prophylaxis     PRN medications     Periodic lab work    Regular Follow up    Charting is done using voice recognition software and may contain errors which have not been completely corrected      Source of history: Nurse, Medical personnel, Medical record, Patient.  History limitation: None.    HPI:      Patient is unable to give any detailed history and therefore history is obtained from the chart  No  acute complaints voiced by the patient or acute concerns raised by nursing    History of hospitalization-  Greg Bustos is a 64 y.o. male presenting to the emergency department via EMS from [facility] for evaluation of hypoxia.  Per EMS patient had an SpO2 of 88% on 4 L of oxygen at the facility and was just recently diagnosed with COVID-19.  Patient had also received an oxycodone for leg pain prior to arrival to hospital.  Patient states he was diagnosed with COVID and has had 2 episodes of diarrhea yesterday.  Patient states he has also been having intermittent midsternal nonradiating chest discomfort and mild shortness of breath that has been worse with exertion.  Patient denies any recent travel or known sick contacts.  Patient denies cough, abdominal pain, nausea, or vomiting.  Patient denies fever or chills.     In ED, COVID-19 positive, glucose 105, sodium 135, potassium 5.4 with a repeat of 3.5.  Hemoglobin 13.1, hematocrit 40.2.  Patient requiring supplemental oxygen and SpO2's remain in the high 80s to low 90s.  Patient ordered dexamethasone 6 mg IV push x 1.  Last echocardiogram on file from 2023 shows an ejection fraction of 50 to 55%.  Chest x-ray showing concern for developing interstitial edema per radiology review.  BNP ordered and pending.  Blood pressure 120/70, heart rate 73, respirations 18, temperature 37.1 °C.  EKG completed showing sinus rhythm at a rate of 70 without ST elevation or depression per ED physician review.  Trending troponin.  Patient has a DNR CC in the computer from his last admission in April 2024.  In discussion with patient he indicates that he would rather be a DNR CCA however a note from his visit with palliative is suggesting a full code.  Patient appears to lack the understanding needed to make this decision.  Patient's POA is his daughter who will need to be contacted to clear up his CODE STATUS.  Per me at this time patient states DNR CCA.  Order placed.   Patient admitted to telemetry observation under the care of Dr. Rose who will continue to follow.  I was asked to do H&P and place initial admission orders.     Past Medical History  CVA, chronic diarrhea, COPD, dyslipidemia, hypertension, GERD, PVD, restless leg syndrome, CAD, tremor, right lower extremity weakness  Surgical History  Cholecystectomy, cataract surgery, cardiac catheterization, ankle surgery        Social History  Former smoker, no drug use, no alcohol use, residing at skilled nursing facility  Family History  Reviewed and noncontributory     Allergies  Hydrocodone-acetaminophen, Divalproex sodium, Gabapentin, and Tramadol    Current Outpatient Medications   Medication Sig Dispense Refill    amLODIPine (Norvasc) 10 mg tablet Take 1 tablet (10 mg) by mouth once daily.      aspirin 81 mg EC tablet Take 1 tablet (81 mg) by mouth once daily.      atorvastatin (Lipitor) 80 mg tablet Take 1 tablet (80 mg) by mouth once daily at bedtime.      carvedilol (Coreg) 25 mg tablet Take 1 tablet (25 mg) by mouth 2 times a day. Take with food.      cilostazol (Pletal) 100 mg tablet Take 1 tablet (100 mg) by mouth 2 times a day.      escitalopram (Lexapro) 10 mg tablet Take 1 tablet (10 mg) by mouth once daily.      fluticasone-umeclidin-vilanter (TRELEGY-ELLIPTA) 100-62.5-25 mcg blister with device Inhale 1 puff once daily. 30 each 0    ipratropium-albuteroL (Duo-Neb) 0.5-2.5 mg/3 mL nebulizer solution Take 3 mL by nebulization 2 times a day. 180 mL 11    mirtazapine (Remeron) 15 mg tablet Take 1 tablet (15 mg) by mouth once daily at bedtime.      oxyCODONE-acetaminophen (Percocet) 5-325 mg tablet Take 1 tablet by mouth every 8 hours.      pantoprazole (ProtoNix) 40 mg EC tablet Take 1 tablet (40 mg) by mouth once daily.       No current facility-administered medications for this visit.       Physical Exam:  Vital signs as per nursing/MA documentation were reviewed  General appearance: Alert and in no acute  distress  HEENT: Normal Inspection  Neck - Normal Inspection  Respiratory : No respiratory distress. Lungs are clear   Cardiovascular: heart rate normal. No gallop  Back - normal inspection  Skin inspection:Warm  Musculoskeletal : No deformities  Neuro : Limited exam. Baseline    ROS: Review of symptoms is negative except for what is mentioned in HPI    Results/Data  Records including but not limited to electronic medical records, relevant lab work and imaging from patient's health care facility encounter were reviewed and independently verified      Charting was completed using voice recognition technology and may include unintended errors.    Discussed with patient/family, RN, and NP.

## 2025-06-10 ENCOUNTER — NURSING HOME VISIT (OUTPATIENT)
Dept: POST ACUTE CARE | Facility: EXTERNAL LOCATION | Age: 65
End: 2025-06-10
Payer: COMMERCIAL

## 2025-06-10 DIAGNOSIS — I63.9 CEREBROVASCULAR ACCIDENT (CVA), UNSPECIFIED MECHANISM (MULTI): ICD-10-CM

## 2025-06-10 DIAGNOSIS — K21.9 GASTROESOPHAGEAL REFLUX DISEASE, UNSPECIFIED WHETHER ESOPHAGITIS PRESENT: ICD-10-CM

## 2025-06-10 DIAGNOSIS — J44.9 CHRONIC OBSTRUCTIVE PULMONARY DISEASE, UNSPECIFIED COPD TYPE (MULTI): Primary | ICD-10-CM

## 2025-06-10 DIAGNOSIS — E78.5 DYSLIPIDEMIA: ICD-10-CM

## 2025-06-10 PROCEDURE — 99308 SBSQ NF CARE LOW MDM 20: CPT | Performed by: EMERGENCY MEDICINE

## 2025-06-10 NOTE — LETTER
Patient: Greg Bustos  : 1960    Encounter Date: 06/10/2025    Greg Bustos   65 y.o.  male  @location@            Assessment and Plan:      Greg is a 64-year-old male patient presenting to ED from skilled nursing facility for evaluation of chest pain and shortness of breath.  Patient states that he also had 2 episodes of diarrhea yesterday.  Patient states he was diagnosed with COVID at the [facility] facility.  Glucose 105, sodium 135, potassium 5.4 with a repeat of 3.5.  Troponin negative x 2.  Patient given dexamethasone 6 mg IV push x 1.  Patient on supplemental oxygen.  Chest x-ray showing a developing interstitial edema, BNP ordered and pending.  Patient admitted to observation for further medical management.     Chest pain/SOB/COVID-19  Admit to telemetry observation per Dr. Rose  See imaging results above  Need to clarify patient's DNR status (with his POA and the patient)  Dexamethasone 6 mg IV push every 24 hours  Supplemental oxygen  BNP ordered and pending  Repeat labs  Trend troponin  Mucinex twice daily  Telemetry monitoring     Hyperkalemia/hyponatremia  K+ 5.4  Repeat K+ 3.5 without intervention  Na+ 135  Repeat labs pending      CVA/COPD/CAD/dyslipidemia/GERD/hypertension/cardiac catheterization  #Chronic conditions  Continue home medications  Cardiac diet  PT/OT     DVT Ppx  SCDs  Heparin subcutaneous  Out of bed with assistance     Rx list reviewed.   PT and OT evaluation is in the process.   Routine safety measures, fall precautions, risk modification and alarm placement if needed for prevention of falls.   Skin care precautions, prevention of pressures sores at pressure points assessed.   Pt needs to be monitored frequently by nursing staff particularly at night time.   Any confusion, agitation or behavioural disturbance needs to be attended, as per home policy   rapid covid Ag assay need to be done, notify if positive.   If needed appropriate measures to be taken for alarm  placements and assisted devices, pt was told not to get up and ambulate at night unless help and assist available at bedside,   labs will be done as per our routine protocol.   PO intake need to be monitored if consuming po.       Charting is done using voice recognition software and may contain errors which have not been completely corrected        Source of history: Nurse, Medical personnel, Medical record, Patient.  History limitation: None.    HPI:      Patient is unable to give any detailed history and therefore history is obtained from the chart  No acute complaints voiced by the patient or acute concerns raised by nursing    History of hospitalization-  Greg Bustos is a 64 y.o. male presenting to the emergency department via EMS from [facility] for evaluation of hypoxia.  Per EMS patient had an SpO2 of 88% on 4 L of oxygen at the facility and was just recently diagnosed with COVID-19.  Patient had also received an oxycodone for leg pain prior to arrival to hospital.  Patient states he was diagnosed with COVID and has had 2 episodes of diarrhea yesterday.  Patient states he has also been having intermittent midsternal nonradiating chest discomfort and mild shortness of breath that has been worse with exertion.  Patient denies any recent travel or known sick contacts.  Patient denies cough, abdominal pain, nausea, or vomiting.  Patient denies fever or chills.     In ED, COVID-19 positive, glucose 105, sodium 135, potassium 5.4 with a repeat of 3.5.  Hemoglobin 13.1, hematocrit 40.2.  Patient requiring supplemental oxygen and SpO2's remain in the high 80s to low 90s.  Patient ordered dexamethasone 6 mg IV push x 1.  Last echocardiogram on file from 2023 shows an ejection fraction of 50 to 55%.  Chest x-ray showing concern for developing interstitial edema per radiology review.  BNP ordered and pending.  Blood pressure 120/70, heart rate 73, respirations 18, temperature 37.1 °C.  EKG completed  showing sinus rhythm at a rate of 70 without ST elevation or depression per ED physician review.  Trending troponin.  Patient has a DNR CC in the computer from his last admission in April 2024.  In discussion with patient he indicates that he would rather be a DNR CCA however a note from his visit with palliative is suggesting a full code.  Patient appears to lack the understanding needed to make this decision.  Patient's POA is his daughter who will need to be contacted to clear up his CODE STATUS.  Per me at this time patient states DNR CCA.  Order placed.  Patient admitted to telemetry observation under the care of Dr. Rose who will continue to follow.  I was asked to do H&P and place initial admission orders.     Past Medical History  CVA, chronic diarrhea, COPD, dyslipidemia, hypertension, GERD, PVD, restless leg syndrome, CAD, tremor, right lower extremity weakness  Surgical History  Cholecystectomy, cataract surgery, cardiac catheterization, ankle surgery        Social History  Former smoker, no drug use, no alcohol use, residing at skilled nursing facility  Family History  Reviewed and noncontributory     Allergies  Hydrocodone-acetaminophen, Divalproex sodium, Gabapentin, and Tramadol    Current Outpatient Medications   Medication Sig Dispense Refill   • amLODIPine (Norvasc) 10 mg tablet Take 1 tablet (10 mg) by mouth once daily.     • aspirin 81 mg EC tablet Take 1 tablet (81 mg) by mouth once daily.     • atorvastatin (Lipitor) 80 mg tablet Take 1 tablet (80 mg) by mouth once daily at bedtime.     • carvedilol (Coreg) 25 mg tablet Take 1 tablet (25 mg) by mouth 2 times a day. Take with food.     • cilostazol (Pletal) 100 mg tablet Take 1 tablet (100 mg) by mouth 2 times a day.     • escitalopram (Lexapro) 10 mg tablet Take 1 tablet (10 mg) by mouth once daily.     • fluticasone-umeclidin-vilanter (TRELEGY-ELLIPTA) 100-62.5-25 mcg blister with device Inhale 1 puff once daily. 30 each 0   •  ipratropium-albuteroL (Duo-Neb) 0.5-2.5 mg/3 mL nebulizer solution Take 3 mL by nebulization 2 times a day. 180 mL 11   • mirtazapine (Remeron) 15 mg tablet Take 1 tablet (15 mg) by mouth once daily at bedtime.     • oxyCODONE-acetaminophen (Percocet) 5-325 mg tablet Take 1 tablet by mouth every 8 hours.     • pantoprazole (ProtoNix) 40 mg EC tablet Take 1 tablet (40 mg) by mouth once daily.       No current facility-administered medications for this visit.       Physical Exam:  Vital signs as per nursing/MA documentation were reviewed  General appearance: Alert and in no acute distress  HEENT: Normal Inspection  Neck - Normal Inspection  Respiratory : No respiratory distress. Lungs are clear   Cardiovascular: heart rate normal. No gallop  Back - normal inspection  Skin inspection:Warm  Musculoskeletal : No deformities  Neuro : Limited exam. Baseline    ROS: Review of symptoms is negative except for what is mentioned in HPI    Results/Data  Records including but not limited to electronic medical records, relevant lab work and imaging from patient's health care facility encounter were reviewed and independently verified      Charting was completed using voice recognition technology and may include unintended errors.    Discussed with patient/family, RN, and NP.    Electronically Signed By: Trell Guzman MD   6/15/25  9:09 AM

## 2025-06-15 NOTE — PROGRESS NOTES
Greg Bustos   65 y.o.  male  @location@            Assessment and Plan:      Greg is a 64-year-old male patient presenting to ED from skilled nursing facility for evaluation of chest pain and shortness of breath.  Patient states that he also had 2 episodes of diarrhea yesterday.  Patient states he was diagnosed with COVID at the [facility] facility.  Glucose 105, sodium 135, potassium 5.4 with a repeat of 3.5.  Troponin negative x 2.  Patient given dexamethasone 6 mg IV push x 1.  Patient on supplemental oxygen.  Chest x-ray showing a developing interstitial edema, BNP ordered and pending.  Patient admitted to observation for further medical management.     Chest pain/SOB/COVID-19  Admit to telemetry observation per Dr. Rose  See imaging results above  Need to clarify patient's DNR status (with his POA and the patient)  Dexamethasone 6 mg IV push every 24 hours  Supplemental oxygen  BNP ordered and pending  Repeat labs  Trend troponin  Mucinex twice daily  Telemetry monitoring     Hyperkalemia/hyponatremia  K+ 5.4  Repeat K+ 3.5 without intervention  Na+ 135  Repeat labs pending      CVA/COPD/CAD/dyslipidemia/GERD/hypertension/cardiac catheterization  #Chronic conditions  Continue home medications  Cardiac diet  PT/OT     DVT Ppx  SCDs  Heparin subcutaneous  Out of bed with assistance     Rx list reviewed.   PT and OT evaluation is in the process.   Routine safety measures, fall precautions, risk modification and alarm placement if needed for prevention of falls.   Skin care precautions, prevention of pressures sores at pressure points assessed.   Pt needs to be monitored frequently by nursing staff particularly at night time.   Any confusion, agitation or behavioural disturbance needs to be attended, as per home policy   rapid covid Ag assay need to be done, notify if positive.   If needed appropriate measures to be taken for alarm placements and assisted devices, pt was told not to get up and ambulate  at night unless help and assist available at bedside,   labs will be done as per our routine protocol.   PO intake need to be monitored if consuming po.       Charting is done using voice recognition software and may contain errors which have not been completely corrected        Source of history: Nurse, Medical personnel, Medical record, Patient.  History limitation: None.    HPI:      Patient is unable to give any detailed history and therefore history is obtained from the chart  No acute complaints voiced by the patient or acute concerns raised by nursing    History of hospitalization-  Greg Bustos is a 64 y.o. male presenting to the emergency department via EMS from [facility] for evaluation of hypoxia.  Per EMS patient had an SpO2 of 88% on 4 L of oxygen at the facility and was just recently diagnosed with COVID-19.  Patient had also received an oxycodone for leg pain prior to arrival to hospital.  Patient states he was diagnosed with COVID and has had 2 episodes of diarrhea yesterday.  Patient states he has also been having intermittent midsternal nonradiating chest discomfort and mild shortness of breath that has been worse with exertion.  Patient denies any recent travel or known sick contacts.  Patient denies cough, abdominal pain, nausea, or vomiting.  Patient denies fever or chills.     In ED, COVID-19 positive, glucose 105, sodium 135, potassium 5.4 with a repeat of 3.5.  Hemoglobin 13.1, hematocrit 40.2.  Patient requiring supplemental oxygen and SpO2's remain in the high 80s to low 90s.  Patient ordered dexamethasone 6 mg IV push x 1.  Last echocardiogram on file from 2023 shows an ejection fraction of 50 to 55%.  Chest x-ray showing concern for developing interstitial edema per radiology review.  BNP ordered and pending.  Blood pressure 120/70, heart rate 73, respirations 18, temperature 37.1 °C.  EKG completed showing sinus rhythm at a rate of 70 without ST elevation or depression  per ED physician review.  Trending troponin.  Patient has a DNR CC in the computer from his last admission in April 2024.  In discussion with patient he indicates that he would rather be a DNR CCA however a note from his visit with palliative is suggesting a full code.  Patient appears to lack the understanding needed to make this decision.  Patient's POA is his daughter who will need to be contacted to clear up his CODE STATUS.  Per me at this time patient states DNR CCA.  Order placed.  Patient admitted to telemetry observation under the care of Dr. Rose who will continue to follow.  I was asked to do H&P and place initial admission orders.     Past Medical History  CVA, chronic diarrhea, COPD, dyslipidemia, hypertension, GERD, PVD, restless leg syndrome, CAD, tremor, right lower extremity weakness  Surgical History  Cholecystectomy, cataract surgery, cardiac catheterization, ankle surgery        Social History  Former smoker, no drug use, no alcohol use, residing at skilled nursing facility  Family History  Reviewed and noncontributory     Allergies  Hydrocodone-acetaminophen, Divalproex sodium, Gabapentin, and Tramadol    Current Outpatient Medications   Medication Sig Dispense Refill    amLODIPine (Norvasc) 10 mg tablet Take 1 tablet (10 mg) by mouth once daily.      aspirin 81 mg EC tablet Take 1 tablet (81 mg) by mouth once daily.      atorvastatin (Lipitor) 80 mg tablet Take 1 tablet (80 mg) by mouth once daily at bedtime.      carvedilol (Coreg) 25 mg tablet Take 1 tablet (25 mg) by mouth 2 times a day. Take with food.      cilostazol (Pletal) 100 mg tablet Take 1 tablet (100 mg) by mouth 2 times a day.      escitalopram (Lexapro) 10 mg tablet Take 1 tablet (10 mg) by mouth once daily.      fluticasone-umeclidin-vilanter (TRELEGY-ELLIPTA) 100-62.5-25 mcg blister with device Inhale 1 puff once daily. 30 each 0    ipratropium-albuteroL (Duo-Neb) 0.5-2.5 mg/3 mL nebulizer solution Take 3 mL by nebulization 2  times a day. 180 mL 11    mirtazapine (Remeron) 15 mg tablet Take 1 tablet (15 mg) by mouth once daily at bedtime.      oxyCODONE-acetaminophen (Percocet) 5-325 mg tablet Take 1 tablet by mouth every 8 hours.      pantoprazole (ProtoNix) 40 mg EC tablet Take 1 tablet (40 mg) by mouth once daily.       No current facility-administered medications for this visit.       Physical Exam:  Vital signs as per nursing/MA documentation were reviewed  General appearance: Alert and in no acute distress  HEENT: Normal Inspection  Neck - Normal Inspection  Respiratory : No respiratory distress. Lungs are clear   Cardiovascular: heart rate normal. No gallop  Back - normal inspection  Skin inspection:Warm  Musculoskeletal : No deformities  Neuro : Limited exam. Baseline    ROS: Review of symptoms is negative except for what is mentioned in HPI    Results/Data  Records including but not limited to electronic medical records, relevant lab work and imaging from patient's health care facility encounter were reviewed and independently verified      Charting was completed using voice recognition technology and may include unintended errors.    Discussed with patient/family, RN, and NP.

## 2025-07-08 ENCOUNTER — NURSING HOME VISIT (OUTPATIENT)
Dept: POST ACUTE CARE | Facility: EXTERNAL LOCATION | Age: 65
End: 2025-07-08
Payer: COMMERCIAL

## 2025-07-08 DIAGNOSIS — I63.9 CEREBROVASCULAR ACCIDENT (CVA), UNSPECIFIED MECHANISM (MULTI): ICD-10-CM

## 2025-07-08 DIAGNOSIS — K21.9 GASTROESOPHAGEAL REFLUX DISEASE, UNSPECIFIED WHETHER ESOPHAGITIS PRESENT: ICD-10-CM

## 2025-07-08 DIAGNOSIS — I73.9 PERIPHERAL VASCULAR DISEASE: ICD-10-CM

## 2025-07-08 DIAGNOSIS — I10 ESSENTIAL HYPERTENSION: ICD-10-CM

## 2025-07-08 DIAGNOSIS — J44.9 CHRONIC OBSTRUCTIVE PULMONARY DISEASE, UNSPECIFIED COPD TYPE (MULTI): Primary | ICD-10-CM

## 2025-07-08 PROCEDURE — 99309 SBSQ NF CARE MODERATE MDM 30: CPT | Performed by: EMERGENCY MEDICINE

## 2025-07-08 NOTE — LETTER
Patient: Greg Bustos  : 1960    Encounter Date: 2025    Greg Bustos   65 y.o.  male  @location@            Assessment and Plan:      Greg is a 64-year-old male patient presenting to ED from [facility] nursing facility for evaluation of chest pain and shortness of breath.  Patient states that he also had 2 episodes of diarrhea yesterday.  [facility]  Glucose 105, sodium 135, potassium 5.4 with a repeat of 3.5.  Troponin negative x 2.  Patient given dexamethasone 6 mg IV push x 1.  Patient on supplemental oxygen.  Chest x-ray showing a developing interstitial edema, BNP ordered and pending.  Patient admitted to observation for further medical management.     Chest pain/SOB/COVID-19  Admit to telemetry observation per Dr. Rose  See imaging results above  Need to clarify patient's DNR status (with his POA and the patient)  Dexamethasone 6 mg IV push every 24 hours  Supplemental oxygen  BNP ordered and pending  Repeat labs  Trend troponin  Mucinex twice daily  Telemetry monitoring     Hyperkalemia/hyponatremia  K+ 5.4  Repeat K+ 3.5 without intervention  Na+ 135  Repeat labs pending      CVA/COPD/CAD/dyslipidemia/GERD/hypertension/cardiac catheterization  #Chronic conditions  Continue home medications  Cardiac diet  PT/OT     DVT Ppx  SCDs  Heparin subcutaneous  Out of bed with assistance     1. medications are reviewed      2. Continue with rehabilitative, supportive, and or restorative care as ordered and as the patient tolerates     3. Laboratory evaluations will be monitored on an ongoing as needed basis     4. Medications have been cross-referenced with the patient's diagnoses list, and medications reductions have been considered and/or implemented.     5. Pharmacy recommendations are addressed on an ongoing as needed basis.     6. Controlled substances have been electronically scripted every 60 days for opiates and others of similar  schedule, and every 6 months for sedative/hypnotics and others of similar schedule.     7. Nursing has been queried about any potential adverse events that need to be reported to me.    Salient information and adjustment of care plan pertaining to this individual patient interaction today are the following:      A. We will continue with restorative and supportive care as the patient tolerates    B. Laboratory examinations will continue to be drawn on an ongoing as-needed basis. The patient's weight needs to be monitored and if needed we may need to institute appetite stimulating medication    C. The patient's long term prognosis is guarded    Charting is done using voice recognition software and may contain errors which may not have been completely corrected        Source of history: Nurse, Medical personnel, Medical record, Patient.  History limitation: None.    HPI:      Patient is unable to give any detailed history and therefore history is obtained from the chart  No acute complaints voiced by the patient or acute concerns raised by nursing    History of hospitalization-  Greg Bustos is a 64 y.o. male presenting to the emergency department via EMS from [facility] for evaluation of hypoxia.  Per EMS patient had an SpO2 of 88% on 4 L of oxygen at the facility and was just recently diagnosed with COVID-19.  Patient had also received an oxycodone for leg pain prior to arrival to hospital.  Patient states he was diagnosed with COVID and has had 2 episodes of diarrhea yesterday.  Patient states he has also been having intermittent midsternal nonradiating chest discomfort and mild shortness of breath that has been worse with exertion.  Patient denies any recent travel or known sick contacts.  Patient denies cough, abdominal pain, nausea, or vomiting.  Patient denies fever or chills.     In ED, COVID-19 positive, glucose 105, sodium 135, potassium 5.4 with a repeat of 3.5.  Hemoglobin 13.1, hematocrit  40.2.  Patient requiring supplemental oxygen and SpO2's remain in the high 80s to low 90s.  Patient ordered dexamethasone 6 mg IV push x 1.  Last echocardiogram on file from 2023 shows an ejection fraction of 50 to 55%.  Chest x-ray showing concern for developing interstitial edema per radiology review.  BNP ordered and pending.  Blood pressure 120/70, heart rate 73, respirations 18, temperature 37.1 °C.  EKG completed showing sinus rhythm at a rate of 70 without ST elevation or depression per ED physician review.  Trending troponin.  Patient has a DNR CC in the computer from his last admission in April 2024.  In discussion with patient he indicates that he would rather be a DNR CCA however a note from his visit with palliative is suggesting a full code.  Patient appears to lack the understanding needed to make this decision.  Patient's POA is his daughter who will need to be contacted to clear up his CODE STATUS.  Per me at this time patient states DNR CCA.  Order placed.  Patient admitted to telemetry observation under the care of Dr. Rose who will continue to follow.  I was asked to do H&P and place initial admission orders.     Past Medical History  CVA, chronic diarrhea, COPD, dyslipidemia, hypertension, GERD, PVD, restless leg syndrome, CAD, tremor, right lower extremity weakness  Surgical History  Cholecystectomy, cataract surgery, cardiac catheterization, ankle surgery        Social History  Former smoker, no drug use, no alcohol use, residing at skilled nursing facility  Family History  Reviewed and noncontributory     Allergies  Hydrocodone-acetaminophen, Divalproex sodium, Gabapentin, and Tramadol    Current Outpatient Medications   Medication Sig Dispense Refill   • amLODIPine (Norvasc) 10 mg tablet Take 1 tablet (10 mg) by mouth once daily.     • aspirin 81 mg EC tablet Take 1 tablet (81 mg) by mouth once daily.     • atorvastatin (Lipitor) 80 mg tablet Take 1 tablet (80 mg) by mouth once daily at  bedtime.     • carvedilol (Coreg) 25 mg tablet Take 1 tablet (25 mg) by mouth 2 times a day. Take with food.     • cilostazol (Pletal) 100 mg tablet Take 1 tablet (100 mg) by mouth 2 times a day.     • escitalopram (Lexapro) 10 mg tablet Take 1 tablet (10 mg) by mouth once daily.     • fluticasone-umeclidin-vilanter (TRELEGY-ELLIPTA) 100-62.5-25 mcg blister with device Inhale 1 puff once daily. 30 each 0   • ipratropium-albuteroL (Duo-Neb) 0.5-2.5 mg/3 mL nebulizer solution Take 3 mL by nebulization 2 times a day. 180 mL 11   • mirtazapine (Remeron) 15 mg tablet Take 1 tablet (15 mg) by mouth once daily at bedtime.     • oxyCODONE-acetaminophen (Percocet) 5-325 mg tablet Take 1 tablet by mouth every 8 hours.     • pantoprazole (ProtoNix) 40 mg EC tablet Take 1 tablet (40 mg) by mouth once daily.       No current facility-administered medications for this visit.       Physical Exam:  Vital signs as per nursing/MA documentation were reviewed  General appearance: Alert and in no acute distress  HEENT: Normal Inspection  Neck - Normal Inspection  Respiratory : No respiratory distress. Lungs are clear   Cardiovascular: heart rate normal. No gallop  Back - normal inspection  Skin inspection:Warm  Musculoskeletal : No deformities  Neuro : Limited exam. Baseline    ROS: Review of symptoms is negative except for what is mentioned in HPI    Results/Data  Records including but not limited to electronic medical records, relevant lab work and imaging from patient's health care facility encounter were reviewed and independently verified      Charting was completed using voice recognition technology and may include unintended errors.    Discussed with patient/family, RN, and NP.    Electronically Signed By: Trell Guzman MD   7/12/25  7:56 AM

## 2025-07-12 NOTE — PROGRESS NOTES
Greg Bustos   65 y.o.  male  @location@            Assessment and Plan:      Greg is a 64-year-old male patient presenting to ED from skilled nursing facility for evaluation of chest pain and shortness of breath.  Patient states that he also had 2 episodes of diarrhea yesterday.  [facility]  Glucose 105, sodium 135, potassium 5.4 with a repeat of 3.5.  Troponin negative x 2.  Patient given dexamethasone 6 mg IV push x 1.  Patient on supplemental oxygen.  Chest x-ray showing a developing interstitial edema, BNP ordered and pending.  Patient admitted to observation for further medical management.     Chest pain/SOB/COVID-19  Admit to telemetry observation per Dr. Rose  See imaging results above  Need to clarify patient's DNR status (with his POA and the patient)  Dexamethasone 6 mg IV push every 24 hours  Supplemental oxygen  BNP ordered and pending  Repeat labs  Trend troponin  Mucinex twice daily  Telemetry monitoring     Hyperkalemia/hyponatremia  K+ 5.4  Repeat K+ 3.5 without intervention  Na+ 135  Repeat labs pending      CVA/COPD/CAD/dyslipidemia/GERD/hypertension/cardiac catheterization  #Chronic conditions  Continue home medications  Cardiac diet  PT/OT     DVT Ppx  SCDs  Heparin subcutaneous  Out of bed with assistance     1. medications are reviewed      2. Continue with rehabilitative, supportive, and or restorative care as ordered and as the patient tolerates     3. Laboratory evaluations will be monitored on an ongoing as needed basis     4. Medications have been cross-referenced with the patient's diagnoses list, and medications reductions have been considered and/or implemented.     5. Pharmacy recommendations are addressed on an ongoing as needed basis.     6. Controlled substances have been electronically scripted every 60 days for opiates and others of similar schedule, and every 6 months for sedative/hypnotics and others of  similar schedule.     7. Nursing has been queried about any potential adverse events that need to be reported to me.    Salient information and adjustment of care plan pertaining to this individual patient interaction today are the following:      A. We will continue with restorative and supportive care as the patient tolerates    B. Laboratory examinations will continue to be drawn on an ongoing as-needed basis. The patient's weight needs to be monitored and if needed we may need to institute appetite stimulating medication    C. The patient's long term prognosis is guarded    Charting is done using voice recognition software and may contain errors which may not have been completely corrected        Source of history: Nurse, Medical personnel, Medical record, Patient.  History limitation: None.    HPI:      Patient is unable to give any detailed history and therefore history is obtained from the chart  No acute complaints voiced by the patient or acute concerns raised by nursing    History of hospitalization-  Greg Bustos is a 64 y.o. male presenting to the emergency department via EMS from [facility] for evaluation of hypoxia.  Per EMS patient had an SpO2 of 88% on 4 L of oxygen at the facility and was just recently diagnosed with COVID-19.  Patient had also received an oxycodone for leg pain prior to arrival to hospital.  Patient states he was diagnosed with COVID and has had 2 episodes of diarrhea yesterday.  Patient states he has also been having intermittent midsternal nonradiating chest discomfort and mild shortness of breath that has been worse with exertion.  Patient denies any recent travel or known sick contacts.  Patient denies cough, abdominal pain, nausea, or vomiting.  Patient denies fever or chills.     In ED, COVID-19 positive, glucose 105, sodium 135, potassium 5.4 with a repeat of 3.5.  Hemoglobin 13.1, hematocrit 40.2.  Patient requiring supplemental oxygen and SpO2's remain in  the high 80s to low 90s.  Patient ordered dexamethasone 6 mg IV push x 1.  Last echocardiogram on file from 2023 shows an ejection fraction of 50 to 55%.  Chest x-ray showing concern for developing interstitial edema per radiology review.  BNP ordered and pending.  Blood pressure 120/70, heart rate 73, respirations 18, temperature 37.1 °C.  EKG completed showing sinus rhythm at a rate of 70 without ST elevation or depression per ED physician review.  Trending troponin.  Patient has a DNR CC in the computer from his last admission in April 2024.  In discussion with patient he indicates that he would rather be a DNR CCA however a note from his visit with palliative is suggesting a full code.  Patient appears to lack the understanding needed to make this decision.  Patient's POA is his daughter who will need to be contacted to clear up his CODE STATUS.  Per me at this time patient states DNR CCA.  Order placed.  Patient admitted to telemetry observation under the care of Dr. Rose who will continue to follow.  I was asked to do H&P and place initial admission orders.     Past Medical History  CVA, chronic diarrhea, COPD, dyslipidemia, hypertension, GERD, PVD, restless leg syndrome, CAD, tremor, right lower extremity weakness  Surgical History  Cholecystectomy, cataract surgery, cardiac catheterization, ankle surgery        Social History  Former smoker, no drug use, no alcohol use, residing at skilled nursing facility  Family History  Reviewed and noncontributory     Allergies  Hydrocodone-acetaminophen, Divalproex sodium, Gabapentin, and Tramadol    Current Outpatient Medications   Medication Sig Dispense Refill    amLODIPine (Norvasc) 10 mg tablet Take 1 tablet (10 mg) by mouth once daily.      aspirin 81 mg EC tablet Take 1 tablet (81 mg) by mouth once daily.      atorvastatin (Lipitor) 80 mg tablet Take 1 tablet (80 mg) by mouth once daily at bedtime.      carvedilol (Coreg) 25 mg tablet Take 1 tablet (25 mg) by  mouth 2 times a day. Take with food.      cilostazol (Pletal) 100 mg tablet Take 1 tablet (100 mg) by mouth 2 times a day.      escitalopram (Lexapro) 10 mg tablet Take 1 tablet (10 mg) by mouth once daily.      fluticasone-umeclidin-vilanter (TRELEGY-ELLIPTA) 100-62.5-25 mcg blister with device Inhale 1 puff once daily. 30 each 0    ipratropium-albuteroL (Duo-Neb) 0.5-2.5 mg/3 mL nebulizer solution Take 3 mL by nebulization 2 times a day. 180 mL 11    mirtazapine (Remeron) 15 mg tablet Take 1 tablet (15 mg) by mouth once daily at bedtime.      oxyCODONE-acetaminophen (Percocet) 5-325 mg tablet Take 1 tablet by mouth every 8 hours.      pantoprazole (ProtoNix) 40 mg EC tablet Take 1 tablet (40 mg) by mouth once daily.       No current facility-administered medications for this visit.       Physical Exam:  Vital signs as per nursing/MA documentation were reviewed  General appearance: Alert and in no acute distress  HEENT: Normal Inspection  Neck - Normal Inspection  Respiratory : No respiratory distress. Lungs are clear   Cardiovascular: heart rate normal. No gallop  Back - normal inspection  Skin inspection:Warm  Musculoskeletal : No deformities  Neuro : Limited exam. Baseline    ROS: Review of symptoms is negative except for what is mentioned in HPI    Results/Data  Records including but not limited to electronic medical records, relevant lab work and imaging from patient's health care facility encounter were reviewed and independently verified      Charting was completed using voice recognition technology and may include unintended errors.    Discussed with patient/family, RN, and NP.

## 2025-08-05 ENCOUNTER — NURSING HOME VISIT (OUTPATIENT)
Dept: POST ACUTE CARE | Facility: EXTERNAL LOCATION | Age: 65
End: 2025-08-05
Payer: COMMERCIAL

## 2025-08-05 DIAGNOSIS — I10 ESSENTIAL HYPERTENSION: ICD-10-CM

## 2025-08-05 DIAGNOSIS — I63.9 CEREBROVASCULAR ACCIDENT (CVA), UNSPECIFIED MECHANISM (MULTI): ICD-10-CM

## 2025-08-05 DIAGNOSIS — J44.9 CHRONIC OBSTRUCTIVE PULMONARY DISEASE, UNSPECIFIED COPD TYPE (MULTI): ICD-10-CM

## 2025-08-05 DIAGNOSIS — K21.9 GASTROESOPHAGEAL REFLUX DISEASE, UNSPECIFIED WHETHER ESOPHAGITIS PRESENT: Primary | ICD-10-CM

## 2025-08-05 NOTE — LETTER
Patient: Greg Bustos  : 1960    Encounter Date: 2025    Greg Bustos   65 y.o.  male  @location@            Assessment and Plan:      Greg is a 64-year-old male patient presenting to ED from [facility] nursing facility for evaluation of chest pain and shortness of breath.  Patient states that he also had 2 episodes of diarrhea yesterday.  Patient states he was diagnosed with COVID at the halfway facility.  Glucose 105, sodium 135, potassium 5.4 with a repeat of 3.5.  Troponin negative x 2.  Patient given dexamethasone 6 mg IV push x 1.  Patient on supplemental oxygen.  Chest x-ray showing a developing interstitial edema, BNP ordered and pending.  Patient admitted to observation for further medical management.     Chest pain/SOB/COVID-19  Admit to telemetry observation per Dr. Rose  See imaging results above  Need to clarify patient's DNR status (with his POA and the patient)  Dexamethasone 6 mg IV push every 24 hours  Supplemental oxygen  BNP ordered and pending  Repeat labs  Trend troponin  Mucinex twice daily  Telemetry monitoring     Hyperkalemia/hyponatremia  K+ 5.4  Repeat K+ 3.5 without intervention  Na+ 135  Repeat labs pending      CVA/COPD/CAD/dyslipidemia/GERD/hypertension/cardiac catheterization  #Chronic conditions  Continue home medications  Cardiac diet  PT/OT     DVT Ppx  SCDs  Heparin subcutaneous  Out of bed with assistance     This patient was seen for my regular monthly visit, nursing evaluations and nursing notes were reviewed, interim events are reviewed, interim concerns and messages were reviewed as we have communicated with nursing staff.  Any issues with the falls, skin care impairment, declining physical condition are reviewed and noted, diagnosis list were reviewed, list of medications were reviewed, living will related issues were reviewed, overall patient has been doing well, any declining in patient's condition or any change in patient's condition needs to be  notified to physician promptly, discussed with nursing staff, if needed would communicate with family.  Patient stays confined here at the facility for long-term care, there are always concerns about long-term care related issues and concerns.  Nursing staff is trying their best to keep patient safe, all sort of measures has been taken to keep patient safe and comfortable.         Source of history: Nurse, Medical personnel, Medical record, Patient.  History limitation: None.    HPI:      Patient is unable to give any detailed history and therefore history is obtained from the chart  No acute complaints voiced by the patient or acute concerns raised by nursing    History of hospitalization-  Greg Bustos is a 64 y.o. male presenting to the emergency department via EMS from [facility] for evaluation of hypoxia.  Per EMS patient had an SpO2 of 88% on 4 L of oxygen at the facility and was just recently diagnosed with COVID-19.  Patient had also received an oxycodone for leg pain prior to arrival to hospital.  Patient states he was diagnosed with COVID and has had 2 episodes of diarrhea yesterday.  Patient states he has also been having intermittent midsternal nonradiating chest discomfort and mild shortness of breath that has been worse with exertion.  Patient denies any recent travel or known sick contacts.  Patient denies cough, abdominal pain, nausea, or vomiting.  Patient denies fever or chills.     In ED, COVID-19 positive, glucose 105, sodium 135, potassium 5.4 with a repeat of 3.5.  Hemoglobin 13.1, hematocrit 40.2.  Patient requiring supplemental oxygen and SpO2's remain in the high 80s to low 90s.  Patient ordered dexamethasone 6 mg IV push x 1.  Last echocardiogram on file from 2023 shows an ejection fraction of 50 to 55%.  Chest x-ray showing concern for developing interstitial edema per radiology review.  BNP ordered and pending.  Blood pressure 120/70, heart rate 73, respirations 18,  temperature 37.1 °C.  EKG completed showing sinus rhythm at a rate of 70 without ST elevation or depression per ED physician review.  Trending troponin.  Patient has a DNR CC in the computer from his last admission in April 2024.  In discussion with patient he indicates that he would rather be a DNR CCA however a note from his visit with palliative is suggesting a full code.  Patient appears to lack the understanding needed to make this decision.  Patient's POA is his daughter who will need to be contacted to clear up his CODE STATUS.  Per me at this time patient states DNR CCA.  Order placed.  Patient admitted to telemetry observation under the care of Dr. Rose who will continue to follow.  I was asked to do H&P and place initial admission orders.     Past Medical History  CVA, chronic diarrhea, COPD, dyslipidemia, hypertension, GERD, PVD, restless leg syndrome, CAD, tremor, right lower extremity weakness  Surgical History  Cholecystectomy, cataract surgery, cardiac catheterization, ankle surgery        Social History  Former smoker, no drug use, no alcohol use, residing at skilled nursing facility  Family History  Reviewed and noncontributory     Allergies  Hydrocodone-acetaminophen, Divalproex sodium, Gabapentin, and Tramadol    Current Outpatient Medications   Medication Sig Dispense Refill   • amLODIPine (Norvasc) 10 mg tablet Take 1 tablet (10 mg) by mouth once daily.     • aspirin 81 mg EC tablet Take 1 tablet (81 mg) by mouth once daily.     • atorvastatin (Lipitor) 80 mg tablet Take 1 tablet (80 mg) by mouth once daily at bedtime.     • carvedilol (Coreg) 25 mg tablet Take 1 tablet (25 mg) by mouth 2 times a day. Take with food.     • cilostazol (Pletal) 100 mg tablet Take 1 tablet (100 mg) by mouth 2 times a day.     • escitalopram (Lexapro) 10 mg tablet Take 1 tablet (10 mg) by mouth once daily.     • fluticasone-umeclidin-vilanter (TRELEGY-ELLIPTA) 100-62.5-25 mcg blister with device Inhale 1 puff once  daily. 30 each 0   • ipratropium-albuteroL (Duo-Neb) 0.5-2.5 mg/3 mL nebulizer solution Take 3 mL by nebulization 2 times a day. 180 mL 11   • mirtazapine (Remeron) 15 mg tablet Take 1 tablet (15 mg) by mouth once daily at bedtime.     • oxyCODONE-acetaminophen (Percocet) 5-325 mg tablet Take 1 tablet by mouth every 8 hours.     • pantoprazole (ProtoNix) 40 mg EC tablet Take 1 tablet (40 mg) by mouth once daily.       No current facility-administered medications for this visit.       Physical Exam:  Vital signs as per nursing/MA documentation were reviewed  General appearance: Alert and in no acute distress  HEENT: Normal Inspection  Neck - Normal Inspection  Respiratory : No respiratory distress. Lungs are clear   Cardiovascular: heart rate normal. No gallop  Back - normal inspection  Skin inspection:Warm  Musculoskeletal : No deformities  Neuro : Limited exam. Baseline    ROS: Review of symptoms is negative except for what is mentioned in HPI    Results/Data  Records including but not limited to electronic medical records, relevant lab work and imaging from patient's health care facility encounter were reviewed and independently verified      Charting was completed using voice recognition technology and may include unintended errors.    Discussed with patient/family, RN, and NP.    Electronically Signed By: Trell Guzman MD   8/7/25  5:19 PM

## 2025-08-07 NOTE — PROGRESS NOTES
Greg Bustos   65 y.o.  male  @location@            Assessment and Plan:      Greg is a 64-year-old male patient presenting to ED from skilled nursing facility for evaluation of chest pain and shortness of breath.  Patient states that he also had 2 episodes of diarrhea yesterday.  Patient states he was diagnosed with COVID at the senior living facility.  Glucose 105, sodium 135, potassium 5.4 with a repeat of 3.5.  Troponin negative x 2.  Patient given dexamethasone 6 mg IV push x 1.  Patient on supplemental oxygen.  Chest x-ray showing a developing interstitial edema, BNP ordered and pending.  Patient admitted to observation for further medical management.     Chest pain/SOB/COVID-19  Admit to telemetry observation per Dr. Rose  See imaging results above  Need to clarify patient's DNR status (with his POA and the patient)  Dexamethasone 6 mg IV push every 24 hours  Supplemental oxygen  BNP ordered and pending  Repeat labs  Trend troponin  Mucinex twice daily  Telemetry monitoring     Hyperkalemia/hyponatremia  K+ 5.4  Repeat K+ 3.5 without intervention  Na+ 135  Repeat labs pending      CVA/COPD/CAD/dyslipidemia/GERD/hypertension/cardiac catheterization  #Chronic conditions  Continue home medications  Cardiac diet  PT/OT     DVT Ppx  SCDs  Heparin subcutaneous  Out of bed with assistance     This patient was seen for my regular monthly visit, nursing evaluations and nursing notes were reviewed, interim events are reviewed, interim concerns and messages were reviewed as we have communicated with nursing staff.  Any issues with the falls, skin care impairment, declining physical condition are reviewed and noted, diagnosis list were reviewed, list of medications were reviewed, living will related issues were reviewed, overall patient has been doing well, any declining in patient's condition or any change in patient's condition needs to be notified to physician promptly, discussed with nursing staff, if needed  would communicate with family.  Patient stays confined here at the facility for long-term care, there are always concerns about long-term care related issues and concerns.  Nursing staff is trying their best to keep patient safe, all sort of measures has been taken to keep patient safe and comfortable.         Source of history: Nurse, Medical personnel, Medical record, Patient.  History limitation: None.    HPI:      Patient is unable to give any detailed history and therefore history is obtained from the chart  No acute complaints voiced by the patient or acute concerns raised by nursing    History of hospitalization-  Greg Bustos is a 64 y.o. male presenting to the emergency department via EMS from [facility] for evaluation of hypoxia.  Per EMS patient had an SpO2 of 88% on 4 L of oxygen at the facility and was just recently diagnosed with COVID-19.  Patient had also received an oxycodone for leg pain prior to arrival to hospital.  Patient states he was diagnosed with COVID and has had 2 episodes of diarrhea yesterday.  Patient states he has also been having intermittent midsternal nonradiating chest discomfort and mild shortness of breath that has been worse with exertion.  Patient denies any recent travel or known sick contacts.  Patient denies cough, abdominal pain, nausea, or vomiting.  Patient denies fever or chills.     In ED, COVID-19 positive, glucose 105, sodium 135, potassium 5.4 with a repeat of 3.5.  Hemoglobin 13.1, hematocrit 40.2.  Patient requiring supplemental oxygen and SpO2's remain in the high 80s to low 90s.  Patient ordered dexamethasone 6 mg IV push x 1.  Last echocardiogram on file from 2023 shows an ejection fraction of 50 to 55%.  Chest x-ray showing concern for developing interstitial edema per radiology review.  BNP ordered and pending.  Blood pressure 120/70, heart rate 73, respirations 18, temperature 37.1 °C.  EKG completed showing sinus rhythm at a rate of 70  without ST elevation or depression per ED physician review.  Trending troponin.  Patient has a DNR CC in the computer from his last admission in April 2024.  In discussion with patient he indicates that he would rather be a DNR CCA however a note from his visit with palliative is suggesting a full code.  Patient appears to lack the understanding needed to make this decision.  Patient's POA is his daughter who will need to be contacted to clear up his CODE STATUS.  Per me at this time patient states DNR CCA.  Order placed.  Patient admitted to telemetry observation under the care of Dr. Rose who will continue to follow.  I was asked to do H&P and place initial admission orders.     Past Medical History  CVA, chronic diarrhea, COPD, dyslipidemia, hypertension, GERD, PVD, restless leg syndrome, CAD, tremor, right lower extremity weakness  Surgical History  Cholecystectomy, cataract surgery, cardiac catheterization, ankle surgery        Social History  Former smoker, no drug use, no alcohol use, residing at skilled nursing facility  Family History  Reviewed and noncontributory     Allergies  Hydrocodone-acetaminophen, Divalproex sodium, Gabapentin, and Tramadol    Current Outpatient Medications   Medication Sig Dispense Refill    amLODIPine (Norvasc) 10 mg tablet Take 1 tablet (10 mg) by mouth once daily.      aspirin 81 mg EC tablet Take 1 tablet (81 mg) by mouth once daily.      atorvastatin (Lipitor) 80 mg tablet Take 1 tablet (80 mg) by mouth once daily at bedtime.      carvedilol (Coreg) 25 mg tablet Take 1 tablet (25 mg) by mouth 2 times a day. Take with food.      cilostazol (Pletal) 100 mg tablet Take 1 tablet (100 mg) by mouth 2 times a day.      escitalopram (Lexapro) 10 mg tablet Take 1 tablet (10 mg) by mouth once daily.      fluticasone-umeclidin-vilanter (TRELEGY-ELLIPTA) 100-62.5-25 mcg blister with device Inhale 1 puff once daily. 30 each 0    ipratropium-albuteroL (Duo-Neb) 0.5-2.5 mg/3 mL nebulizer  solution Take 3 mL by nebulization 2 times a day. 180 mL 11    mirtazapine (Remeron) 15 mg tablet Take 1 tablet (15 mg) by mouth once daily at bedtime.      oxyCODONE-acetaminophen (Percocet) 5-325 mg tablet Take 1 tablet by mouth every 8 hours.      pantoprazole (ProtoNix) 40 mg EC tablet Take 1 tablet (40 mg) by mouth once daily.       No current facility-administered medications for this visit.       Physical Exam:  Vital signs as per nursing/MA documentation were reviewed  General appearance: Alert and in no acute distress  HEENT: Normal Inspection  Neck - Normal Inspection  Respiratory : No respiratory distress. Lungs are clear   Cardiovascular: heart rate normal. No gallop  Back - normal inspection  Skin inspection:Warm  Musculoskeletal : No deformities  Neuro : Limited exam. Baseline    ROS: Review of symptoms is negative except for what is mentioned in HPI    Results/Data  Records including but not limited to electronic medical records, relevant lab work and imaging from patient's health care facility encounter were reviewed and independently verified      Charting was completed using voice recognition technology and may include unintended errors.    Discussed with patient/family, RN, and NP.